# Patient Record
Sex: MALE | Race: WHITE | NOT HISPANIC OR LATINO | Employment: OTHER | URBAN - METROPOLITAN AREA
[De-identification: names, ages, dates, MRNs, and addresses within clinical notes are randomized per-mention and may not be internally consistent; named-entity substitution may affect disease eponyms.]

---

## 2023-04-05 ENCOUNTER — OFFICE VISIT (OUTPATIENT)
Dept: PRIMARY CARE | Facility: CLINIC | Age: 78
End: 2023-04-05
Payer: MEDICARE

## 2023-04-05 VITALS
SYSTOLIC BLOOD PRESSURE: 130 MMHG | HEART RATE: 47 BPM | DIASTOLIC BLOOD PRESSURE: 66 MMHG | BODY MASS INDEX: 26.77 KG/M2 | OXYGEN SATURATION: 96 % | WEIGHT: 197.4 LBS | TEMPERATURE: 97.1 F

## 2023-04-05 DIAGNOSIS — Z00.00 HEALTHCARE MAINTENANCE: ICD-10-CM

## 2023-04-05 DIAGNOSIS — F39 MOOD DISORDER (CMS-HCC): ICD-10-CM

## 2023-04-05 DIAGNOSIS — R55 SYNCOPE, UNSPECIFIED SYNCOPE TYPE: Primary | ICD-10-CM

## 2023-04-05 DIAGNOSIS — R00.1 SINUS BRADYCARDIA: ICD-10-CM

## 2023-04-05 DIAGNOSIS — E78.49 OTHER HYPERLIPIDEMIA: ICD-10-CM

## 2023-04-05 DIAGNOSIS — F10.10 ALCOHOL ABUSE: ICD-10-CM

## 2023-04-05 PROBLEM — R73.01 ELEVATED FASTING GLUCOSE: Status: ACTIVE | Noted: 2023-04-05

## 2023-04-05 PROBLEM — I70.0 ABDOMINAL AORTIC ATHEROSCLEROSIS (CMS-HCC): Status: ACTIVE | Noted: 2023-04-05

## 2023-04-05 PROBLEM — R79.89 LOW VITAMIN B12 LEVEL: Status: ACTIVE | Noted: 2023-04-05

## 2023-04-05 PROBLEM — H93.19 TINNITUS: Status: ACTIVE | Noted: 2023-04-05

## 2023-04-05 PROBLEM — F41.9 ANXIETY: Status: ACTIVE | Noted: 2023-04-05

## 2023-04-05 PROBLEM — F32.A MILD DEPRESSION: Status: ACTIVE | Noted: 2023-04-05

## 2023-04-05 PROBLEM — F03.918 DEMENTIA WITH BEHAVIORAL DISTURBANCE (MULTI): Status: ACTIVE | Noted: 2020-06-29

## 2023-04-05 PROBLEM — N18.31 STAGE 3A CHRONIC KIDNEY DISEASE (MULTI): Status: ACTIVE | Noted: 2023-04-05

## 2023-04-05 PROBLEM — I10 BENIGN ESSENTIAL HTN: Status: ACTIVE | Noted: 2023-04-05

## 2023-04-05 PROBLEM — E78.5 HYPERLIPIDEMIA: Status: ACTIVE | Noted: 2023-04-05

## 2023-04-05 PROBLEM — M1A.9XX0 CHRONIC GOUT: Status: ACTIVE | Noted: 2023-04-05

## 2023-04-05 PROCEDURE — 3078F DIAST BP <80 MM HG: CPT | Performed by: NURSE PRACTITIONER

## 2023-04-05 PROCEDURE — 1036F TOBACCO NON-USER: CPT | Performed by: NURSE PRACTITIONER

## 2023-04-05 PROCEDURE — 93000 ELECTROCARDIOGRAM COMPLETE: CPT | Performed by: NURSE PRACTITIONER

## 2023-04-05 PROCEDURE — 99214 OFFICE O/P EST MOD 30 MIN: CPT | Performed by: NURSE PRACTITIONER

## 2023-04-05 PROCEDURE — 1159F MED LIST DOCD IN RCRD: CPT | Performed by: NURSE PRACTITIONER

## 2023-04-05 PROCEDURE — 3075F SYST BP GE 130 - 139MM HG: CPT | Performed by: NURSE PRACTITIONER

## 2023-04-05 PROCEDURE — 1160F RVW MEDS BY RX/DR IN RCRD: CPT | Performed by: NURSE PRACTITIONER

## 2023-04-05 RX ORDER — LISINOPRIL AND HYDROCHLOROTHIAZIDE 12.5; 2 MG/1; MG/1
1 TABLET ORAL DAILY
COMMUNITY
Start: 2020-08-04 | End: 2023-05-10

## 2023-04-05 RX ORDER — COLCHICINE 0.6 MG/1
0.6 CAPSULE ORAL DAILY
COMMUNITY
Start: 2023-03-15 | End: 2024-02-19 | Stop reason: SDUPTHER

## 2023-04-05 RX ORDER — LAMOTRIGINE 200 MG/1
200 TABLET ORAL DAILY
COMMUNITY
Start: 2022-08-04

## 2023-04-05 ASSESSMENT — ENCOUNTER SYMPTOMS
DIZZINESS: 0
FEVER: 0
DYSURIA: 0
VOMITING: 0
FACIAL ASYMMETRY: 0
SEIZURES: 0
FATIGUE: 0
CHILLS: 0
NUMBNESS: 0
HEADACHES: 0
WEAKNESS: 0
COUGH: 0
FREQUENCY: 0
CONFUSION: 0
NAUSEA: 0
SHORTNESS OF BREATH: 0
DIARRHEA: 0
ABDOMINAL PAIN: 0

## 2023-04-05 NOTE — PATIENT INSTRUCTIONS
Obtain fasting blood work as ordered  Referral placed to cardiology for sinus bradycardia and changes on EKG-you have an appointment scheduled tomorrow with Dr. Espinosa at 2 PM  Schedule stress echocardiogram as ordered  Schedule CT cardiac scoring as ordered  Schedule bilateral vascular carotid ultrasound as ordered  If you develop recurrent symptoms please report to the emergency room immediately  Follow-up with your PCP as scheduled

## 2023-04-05 NOTE — ASSESSMENT & PLAN NOTE
Patient reports averaging 2-3 beers daily   Occasionally pham  Educated patient on alcoholism and effects on body symptoms  Encouraged patient to cut back on alcohol use.    Complex Repair And A-T Advancement Flap Text: The defect edges were debeveled with a #15 scalpel blade.  The primary defect was closed partially with a complex linear closure.  Given the location of the remaining defect, shape of the defect and the proximity to free margins an A-T advancement flap was deemed most appropriate for complete closure of the defect.  Using a sterile surgical marker, an appropriate advancement flap was drawn incorporating the defect and placing the expected incisions within the relaxed skin tension lines where possible.    The area thus outlined was incised deep to adipose tissue with a #15 scalpel blade.  The skin margins were undermined to an appropriate distance in all directions utilizing iris scissors.

## 2023-04-05 NOTE — PROGRESS NOTES
Subjective   Chief Complaint: Loss of Consciousness (2-3 minutes. 4/4).    HPI   Bonifacio Ferraro is a 77 y.o. male who presents for Loss of Consciousness (2-3 minutes. 4/4).  Patient presents today with his wife at side.  They report that they were at the [employer] yesterday evening for dinner and they were waiting for their check.  Patient's wife reports that the patient reached for his Coke and then grabbed his throat and experienced a syncopal episode.  She reports that his eyes remained open but he did not respond.  She reports that this lasted about 2 to 3 minutes and then patient came back to consciousness.  Patient does not recall why he grabbed his throat prior to the event or has any recollection of the event event occurring.  He denies anything similar in the past.  Denies anything unusual about how he was feeling yesterday. He did report being outside working in the yard for multiple hours and believes that he may have overdone it.  Patient does have about 2 beers daily and reports that he did have 2 beers prior to his dinner.    He denies history of heart disease.   He is on Lamicatal for mood disorder and follows with a nurse psychologist.     Patient denies fever, chills, nausea, vomiting, diarrhea, chest pain, heart palpations, or shortness of breath.      Review of Systems   Constitutional:  Negative for chills, fatigue and fever.   Respiratory:  Negative for cough and shortness of breath.    Cardiovascular:  Negative for chest pain.   Gastrointestinal:  Negative for abdominal pain, diarrhea, nausea and vomiting.   Genitourinary:  Negative for dysuria, frequency and urgency.   Neurological:  Positive for syncope. Negative for dizziness, seizures, facial asymmetry, weakness, numbness and headaches.   Psychiatric/Behavioral:  Negative for behavioral problems and confusion.        Objective   /66   Pulse (!) 47   Temp 36.2 °C (97.1 °F) (Temporal)   Wt 89.5 kg (197 lb 6.4 oz)   SpO2  96%   BMI 26.77 kg/m²   BSA Body surface area is 2.13 meters squared.      Physical Exam  Constitutional:       Appearance: Normal appearance.   Cardiovascular:      Rate and Rhythm: Normal rate and regular rhythm.   Pulmonary:      Effort: Pulmonary effort is normal.      Breath sounds: Normal breath sounds.   Abdominal:      General: Abdomen is flat.      Palpations: Abdomen is soft.   Neurological:      General: No focal deficit present.      Mental Status: He is alert.   Psychiatric:         Mood and Affect: Mood normal.         Behavior: Behavior normal.       No visits with results within 1 Year(s) from this visit.   Latest known visit with results is:   [facility] Encounter on 09/14/2021   Component Date Value Ref Range Status    TSH 09/14/2021 2.20  0.44 - 3.98 mIU/L Final    Comment:  TSH testing is performed using different testing    methodology at [facility] than at other    [facility]. Direct result comparisons should    only be made within the same method.      Glucose 09/14/2021 93  74 - 99 mg/dL Final    Sodium 09/14/2021 142  136 - 145 mmol/L Final    Potassium 09/14/2021 4.3  3.5 - 5.3 mmol/L Final    Chloride 09/14/2021 102  98 - 107 mmol/L Final    Bicarbonate 09/14/2021 26  21 - 32 mmol/L Final    Anion Gap 09/14/2021 18  10 - 20 mmol/L Final    Urea Nitrogen 09/14/2021 17  6 - 23 mg/dL Final    Creatinine 09/14/2021 1.23  0.50 - 1.30 mg/dL Final    GLOMERULAR FILTRATION RATE-NON AFR* 09/14/2021 58 (A)  >60 mL/min/1.73m2 Final    GLOMERULAR FILTRATION RATE-* 09/14/2021 70  >60 mL/min/1.73m2 Final    Comment:  CALCULATIONS OF ESTIMATED GFR ARE PERFORMED   USING THE MDRD STUDY EQUATION FOR THE   IDMS-TRACEABLE CREATININE METHODS.   CLIN CHEM 2007;53:766-72      Calcium 09/14/2021 9.3  8.6 - 10.6 mg/dL Final    Albumin 09/14/2021 4.5  3.4 - 5.0 g/dL Final    Alkaline Phosphatase 09/14/2021 57  33 - 136 U/L Final    Total Protein 09/14/2021 7.3  6.4 - 8.2 g/dL Final    AST  09/14/2021 17  9 - 39 U/L Final    Total Bilirubin 09/14/2021 1.6 (H)  0.0 - 1.2 mg/dL Final    ALT (SGPT) 09/14/2021 10  10 - 52 U/L Final    Comment:  Patients treated with Sulfasalazine may generate    falsely decreased results for ALT.      Vitamin B-12 09/14/2021 361  211 - 911 pg/mL Final    Cholesterol 09/14/2021 176  0 - 199 mg/dL Final    Comment: .      AGE      DESIRABLE   BORDERLINE HIGH   HIGH     0-19 Y     0 - 169       170 - 199     >/= 200    20-24 Y     0 - 189       190 - 224     >/= 225         >24 Y     0 - 199       200 - 239     >/= 240   **All ranges are based on fasting samples. Specific   therapeutic targets will vary based on patient-specific   cardiac risk.  .   Pediatric guidelines reference:Pediatrics 2011, 128(S5).   Adult guidelines reference: NCEP ATPIII Guidelines,     DARNELL 2001, 258:2486-97  .   Venipuncture immediately after or during the    administration of Metamizole may lead to falsely   low results. Testing should be performed immediately   prior to Metamizole dosing.      HDL 09/14/2021 43.1  mg/dL Final    Comment: .      AGE      VERY LOW   LOW     NORMAL    HIGH       0-19 Y       < 35   < 40     40-45     ----    20-24 Y       ----   < 40       >45     ----      >24 Y       ----   < 40     40-60      >60  .      Cholesterol/HDL Ratio 09/14/2021 4.1   Final    Comment: REF VALUES  DESIRABLE  < 3.4  HIGH RISK  > 5.0      LDL 09/14/2021 111 (H)  0 - 99 mg/dL Final    Comment: .                           NEAR      BORD      AGE      DESIRABLE  OPTIMAL    HIGH     HIGH     VERY HIGH     0-19 Y     0 - 109     ---    110-129   >/= 130     ----    20-24 Y     0 - 119     ---    120-159   >/= 160     ----      >24 Y     0 -  99   100-129  130-159   160-189     >/=190  .      VLDL 09/14/2021 22  0 - 40 mg/dL Final    Triglycerides 09/14/2021 109  0 - 149 mg/dL Final    Comment: .      AGE      DESIRABLE   BORDERLINE HIGH   HIGH     VERY HIGH   0 D-90 D    19 - 174         ----          ----        ----  91 D- 9 Y     0 -  74        75 -  99     >/= 100      ----    10-19 Y     0 -  89        90 - 129     >/= 130      ----    20-24 Y     0 - 114       115 - 149     >/= 150      ----         >24 Y     0 - 149       150 - 199    200- 499    >/= 500  .   Venipuncture immediately after or during the    administration of Metamizole may lead to falsely   low results. Testing should be performed immediately   prior to Metamizole dosing.      Uric Acid 09/14/2021 7.6 (H)  4.0 - 7.5 mg/dL Final    Comment:  Venipuncture immediately after or during the    administration of Metamizole may lead to falsely   low results. Testing should be performed immediately   prior to Metamizole dosing.      Color, Urine 09/14/2021 YELLOW  STRAW,YELLOW Final    Appearance, Urine 09/14/2021 CLEAR  CLEAR Final    Specific Gravity, Urine 09/14/2021 1.018  1.005 - 1.035 Final    pH, Urine 09/14/2021 5.0  5.0 - 8.0 Final    Protein, Urine 09/14/2021 NEGATIVE  NEGATIVE mg/dL Final    Glucose, Urine 09/14/2021 NEGATIVE  NEGATIVE mg/dL Final    Blood, Urine 09/14/2021 NEGATIVE  NEGATIVE Final    Ketones, Urine 09/14/2021 NEGATIVE  NEGATIVE mg/dL Final    Bilirubin, Urine 09/14/2021 NEGATIVE  NEGATIVE Final    Urobilinogen, Urine 09/14/2021 <2.0  0.0 - 1.9 mg/dL Final    Nitrite, Urine 09/14/2021 NEGATIVE  NEGATIVE Final    Leukocyte Esterase, Urine 09/14/2021 NEGATIVE  NEGATIVE Final    WBC 09/14/2021 6.2  4.4 - 11.3 x10E9/L Final    nRBC 09/14/2021 0.0  0.0 - 0.0 /100 WBC Final    RBC 09/14/2021 4.77  4.50 - 5.90 x10E12/L Final    Hemoglobin 09/14/2021 15.0  13.5 - 17.5 g/dL Final    Hematocrit 09/14/2021 46.1  41.0 - 52.0 % Final    MCV 09/14/2021 97  80 - 100 fL Final    MCHC 09/14/2021 32.5  32.0 - 36.0 g/dL Final    Platelets 09/14/2021 235  150 - 450 x10E9/L Final    RDW 09/14/2021 12.7  11.5 - 14.5 % Final    Neutrophils % 09/14/2021 71.4  40.0 - 80.0 % Final    Immature Granulocytes %, Automated 09/14/2021 0.5  0.0 -  0.9 % Final    Comment:  Immature Granulocyte Count (IG) includes promyelocytes,    myelocytes and metamyelocytes but does not include bands.   Percent differential counts (%) should be interpreted in the   context of the absolute cell counts (cells/L).      Lymphocytes % 09/14/2021 14.6  13.0 - 44.0 % Final    Monocytes % 09/14/2021 10.4  2.0 - 10.0 % Final    Eosinophils % 09/14/2021 2.6  0.0 - 6.0 % Final    Basophils % 09/14/2021 0.5  0.0 - 2.0 % Final    Neutrophils Absolute 09/14/2021 4.46  1.60 - 5.50 x10E9/L Final    Lymphocytes Absolute 09/14/2021 0.91  0.80 - 3.00 x10E9/L Final    Monocytes Absolute 09/14/2021 0.65  0.05 - 0.80 x10E9/L Final    Eosinophils Absolute 09/14/2021 0.16  0.00 - 0.40 x10E9/L Final    Basophils Absolute 09/14/2021 0.03  0.00 - 0.10 x10E9/L Final    Prostate Specific Antigen,Screen 09/14/2021 0.88  0.00 - 4.00 ng/mL Final    Comment: The FDA requires that the method used for PSA assay be   reported to the physician. Values obtained with different   assay methods must not be used interchangeably. This test   was performed at [facility] using the Siemens  ABC Livellica PSA method, which is a sandwich immunoassay using   chemiluminescence for quantitation. The assay is approved  for measurement of prostate-specific antigen (PSA) in   serum and may be used in conjunction with a digital rectal  examination in men 50 years and older as an aid in   detection of prostate cancer.   5-Alpha-reductase inhibitors (e.g. Proscar, Finasteride,   Avodart, Dutasteride and Ariadna) for the treatment of BPH   have been shown to lower PSA levels by an average of 50%   after 6 months of treatment.      LAMOTRIGINE LEVEL 09/14/2021 5.2  2.5 - 15.0 ug/mL Final     Current Outpatient Medications on File Prior to Visit   Medication Sig Dispense Refill    colchicine (Mitigare) 0.6 mg capsule capsule Take 1 capsule (0.6 mg) by mouth once daily.      lamoTRIgine (LaMICtal) 200 mg tablet Take 1  tablet (200 mg) by mouth once daily.      lisinopriL-hydrochlorothiazide 20-12.5 mg tablet Take 1 tablet by mouth once daily.       No current facility-administered medications on file prior to visit.     No images are attached to the encounter.            Assessment/Plan   Problem List Items Addressed This Visit          Circulatory    Sinus bradycardia     IO EKG shows Sinus eros (HR 47)  Referral to cardiology             Relevant Orders    Holter or Event Cardiac Monitor    ECG 12 lead (Completed)       Other    Syncope - Primary     Patient experienced syncopal episode yesterday while at dinner waiting for his check.   - IO EKG shows Sinus bradycardia (HR 47) and unclear if new Q waves -- patient declined ER   - BW ordered   - 7 day holter monitor ordered  - Patient referred to cardiology and apt scheduled for tomorrow at 2pm   - B/l carotid vascular US ordered   - Cardiac stress test ordered   - CT cardiac score ordered   - Advised patient that if symptoms recur he should report to the ER immediately -- patient is agreeable          Relevant Orders    CT cardiac scoring wo IV contrast    Vascular US carotid artery duplex bilateral    CBC and Auto Differential    Comprehensive Metabolic Panel    Echocardiogram stress test    Holter or Event Cardiac Monitor    ECG 12 lead (Completed)    Alcohol abuse     Patient reports averaging 2-3 beers daily   Occasionally whiskey  Educated patient on alcoholism and effects on body symptoms  Encouraged patient to cut back on alcohol use.          Hyperlipidemia     BW ordered  CT cardiac score ordered          Mood disorder (CMS/HCC)     Follows with nurse psych  Continues on Lamictal           Other Visit Diagnoses       Healthcare maintenance        Relevant Orders    CBC and Auto Differential    Comprehensive Metabolic Panel    TSH with reflex to Free T4 if abnormal    Lipid Panel    PSA, Total and Free

## 2023-04-05 NOTE — ASSESSMENT & PLAN NOTE
Patient experienced syncopal episode yesterday while at dinner waiting for his check.   - IO EKG shows Sinus bradycardia (HR 47) and unclear if new Q waves -- patient declined ER   - BW ordered   - 7 day holter monitor ordered  - Patient referred to cardiology and apt scheduled for tomorrow at 2pm   - B/l carotid vascular US ordered   - Cardiac stress test ordered   - CT cardiac score ordered   - Advised patient that if symptoms recur he should report to the ER immediately -- patient is agreeable

## 2023-04-06 ENCOUNTER — LAB (OUTPATIENT)
Dept: LAB | Facility: LAB | Age: 78
End: 2023-04-06
Payer: MEDICARE

## 2023-04-06 DIAGNOSIS — Z00.00 HEALTHCARE MAINTENANCE: ICD-10-CM

## 2023-04-06 DIAGNOSIS — R55 SYNCOPE, UNSPECIFIED SYNCOPE TYPE: ICD-10-CM

## 2023-04-06 LAB
ALANINE AMINOTRANSFERASE (SGPT) (U/L) IN SER/PLAS: 11 U/L (ref 10–52)
ALBUMIN (G/DL) IN SER/PLAS: 4.3 G/DL (ref 3.4–5)
ALKALINE PHOSPHATASE (U/L) IN SER/PLAS: 55 U/L (ref 33–136)
ANION GAP IN SER/PLAS: 15 MMOL/L (ref 10–20)
ASPARTATE AMINOTRANSFERASE (SGOT) (U/L) IN SER/PLAS: 29 U/L (ref 9–39)
BASOPHILS (10*3/UL) IN BLOOD BY AUTOMATED COUNT: 0.03 X10E9/L (ref 0–0.1)
BASOPHILS/100 LEUKOCYTES IN BLOOD BY AUTOMATED COUNT: 0.6 % (ref 0–2)
BILIRUBIN TOTAL (MG/DL) IN SER/PLAS: 1.7 MG/DL (ref 0–1.2)
CALCIUM (MG/DL) IN SER/PLAS: 9.1 MG/DL (ref 8.6–10.6)
CARBON DIOXIDE, TOTAL (MMOL/L) IN SER/PLAS: 26 MMOL/L (ref 21–32)
CHLORIDE (MMOL/L) IN SER/PLAS: 101 MMOL/L (ref 98–107)
CHOLESTEROL (MG/DL) IN SER/PLAS: 184 MG/DL (ref 0–199)
CHOLESTEROL IN HDL (MG/DL) IN SER/PLAS: 50.6 MG/DL
CHOLESTEROL/HDL RATIO: 3.6
CREATININE (MG/DL) IN SER/PLAS: 1.13 MG/DL (ref 0.5–1.3)
EOSINOPHILS (10*3/UL) IN BLOOD BY AUTOMATED COUNT: 0.29 X10E9/L (ref 0–0.4)
EOSINOPHILS/100 LEUKOCYTES IN BLOOD BY AUTOMATED COUNT: 5.6 % (ref 0–6)
ERYTHROCYTE DISTRIBUTION WIDTH (RATIO) BY AUTOMATED COUNT: 12.7 % (ref 11.5–14.5)
ERYTHROCYTE MEAN CORPUSCULAR HEMOGLOBIN CONCENTRATION (G/DL) BY AUTOMATED: 32.4 G/DL (ref 32–36)
ERYTHROCYTE MEAN CORPUSCULAR VOLUME (FL) BY AUTOMATED COUNT: 95 FL (ref 80–100)
ERYTHROCYTES (10*6/UL) IN BLOOD BY AUTOMATED COUNT: 5.1 X10E12/L (ref 4.5–5.9)
GFR MALE: 68 ML/MIN/1.73M2
GLUCOSE (MG/DL) IN SER/PLAS: 96 MG/DL (ref 74–99)
HEMATOCRIT (%) IN BLOOD BY AUTOMATED COUNT: 48.4 % (ref 41–52)
HEMOGLOBIN (G/DL) IN BLOOD: 15.7 G/DL (ref 13.5–17.5)
IMMATURE GRANULOCYTES/100 LEUKOCYTES IN BLOOD BY AUTOMATED COUNT: 0.4 % (ref 0–0.9)
LDL: 108 MG/DL (ref 0–99)
LEUKOCYTES (10*3/UL) IN BLOOD BY AUTOMATED COUNT: 5.2 X10E9/L (ref 4.4–11.3)
LYMPHOCYTES (10*3/UL) IN BLOOD BY AUTOMATED COUNT: 1.03 X10E9/L (ref 0.8–3)
LYMPHOCYTES/100 LEUKOCYTES IN BLOOD BY AUTOMATED COUNT: 19.8 % (ref 13–44)
MONOCYTES (10*3/UL) IN BLOOD BY AUTOMATED COUNT: 0.61 X10E9/L (ref 0.05–0.8)
MONOCYTES/100 LEUKOCYTES IN BLOOD BY AUTOMATED COUNT: 11.7 % (ref 2–10)
NEUTROPHILS (10*3/UL) IN BLOOD BY AUTOMATED COUNT: 3.22 X10E9/L (ref 1.6–5.5)
NEUTROPHILS/100 LEUKOCYTES IN BLOOD BY AUTOMATED COUNT: 61.9 % (ref 40–80)
NRBC (PER 100 WBCS) BY AUTOMATED COUNT: 0 /100 WBC (ref 0–0)
PLATELETS (10*3/UL) IN BLOOD AUTOMATED COUNT: 234 X10E9/L (ref 150–450)
POTASSIUM (MMOL/L) IN SER/PLAS: 5.2 MMOL/L (ref 3.5–5.3)
PROTEIN TOTAL: 7.1 G/DL (ref 6.4–8.2)
SODIUM (MMOL/L) IN SER/PLAS: 137 MMOL/L (ref 136–145)
THYROTROPIN (MIU/L) IN SER/PLAS BY DETECTION LIMIT <= 0.05 MIU/L: 1.35 MIU/L (ref 0.44–3.98)
TRIGLYCERIDE (MG/DL) IN SER/PLAS: 129 MG/DL (ref 0–149)
UREA NITROGEN (MG/DL) IN SER/PLAS: 25 MG/DL (ref 6–23)
VLDL: 26 MG/DL (ref 0–40)

## 2023-04-06 PROCEDURE — 84154 ASSAY OF PSA FREE: CPT

## 2023-04-06 PROCEDURE — 80061 LIPID PANEL: CPT

## 2023-04-06 PROCEDURE — 84443 ASSAY THYROID STIM HORMONE: CPT

## 2023-04-06 PROCEDURE — 36415 COLL VENOUS BLD VENIPUNCTURE: CPT

## 2023-04-06 PROCEDURE — 85025 COMPLETE CBC W/AUTO DIFF WBC: CPT

## 2023-04-06 PROCEDURE — 80053 COMPREHEN METABOLIC PANEL: CPT

## 2023-04-06 PROCEDURE — 84153 ASSAY OF PSA TOTAL: CPT

## 2023-04-10 LAB
PROSTATE SPECIFIC AG (NG/ML) IN SER/PLAS: 0.8 NG/ML (ref 0–4)
PROSTATE SPECIFIC AG FREE (NG/ML) IN SER/PLAS: 0.2 NG/ML
PROSTATE SPECIFIC AG FREE/PROSTATE SPECIFIC AG TOTAL IN SER/PLAS: 25 %

## 2023-04-27 ENCOUNTER — OFFICE VISIT (OUTPATIENT)
Dept: PRIMARY CARE | Facility: CLINIC | Age: 78
End: 2023-04-27
Payer: MEDICARE

## 2023-04-27 ENCOUNTER — APPOINTMENT (OUTPATIENT)
Dept: PRIMARY CARE | Facility: CLINIC | Age: 78
End: 2023-04-27
Payer: MEDICARE

## 2023-04-27 VITALS
BODY MASS INDEX: 27.58 KG/M2 | HEIGHT: 71 IN | HEART RATE: 52 BPM | TEMPERATURE: 96.6 F | WEIGHT: 197 LBS | OXYGEN SATURATION: 96 % | DIASTOLIC BLOOD PRESSURE: 56 MMHG | SYSTOLIC BLOOD PRESSURE: 122 MMHG

## 2023-04-27 DIAGNOSIS — F39 MOOD DISORDER (CMS-HCC): ICD-10-CM

## 2023-04-27 DIAGNOSIS — I70.0 ABDOMINAL AORTIC ATHEROSCLEROSIS (CMS-HCC): ICD-10-CM

## 2023-04-27 DIAGNOSIS — N18.31 STAGE 3A CHRONIC KIDNEY DISEASE (MULTI): ICD-10-CM

## 2023-04-27 DIAGNOSIS — Z00.00 MEDICARE ANNUAL WELLNESS VISIT, SUBSEQUENT: Primary | ICD-10-CM

## 2023-04-27 DIAGNOSIS — F03.918 DEMENTIA WITH BEHAVIORAL DISTURBANCE (MULTI): ICD-10-CM

## 2023-04-27 DIAGNOSIS — Z71.89 CARDIAC RISK COUNSELING: ICD-10-CM

## 2023-04-27 DIAGNOSIS — Z00.00 ROUTINE GENERAL MEDICAL EXAMINATION AT HEALTH CARE FACILITY: ICD-10-CM

## 2023-04-27 DIAGNOSIS — Z13.89 ENCOUNTER FOR SCREENING FOR OTHER DISORDER: ICD-10-CM

## 2023-04-27 DIAGNOSIS — E78.49 OTHER HYPERLIPIDEMIA: ICD-10-CM

## 2023-04-27 DIAGNOSIS — R79.89 LOW VITAMIN B12 LEVEL: ICD-10-CM

## 2023-04-27 DIAGNOSIS — F41.9 ANXIETY: ICD-10-CM

## 2023-04-27 DIAGNOSIS — M1A.9XX0 CHRONIC GOUT INVOLVING TOE OF RIGHT FOOT WITHOUT TOPHUS, UNSPECIFIED CAUSE: ICD-10-CM

## 2023-04-27 DIAGNOSIS — I10 BENIGN ESSENTIAL HTN: ICD-10-CM

## 2023-04-27 DIAGNOSIS — R00.1 SINUS BRADYCARDIA: ICD-10-CM

## 2023-04-27 DIAGNOSIS — F32.A MILD DEPRESSION: ICD-10-CM

## 2023-04-27 DIAGNOSIS — E55.9 VITAMIN D DEFICIENCY: ICD-10-CM

## 2023-04-27 PROCEDURE — G0439 PPPS, SUBSEQ VISIT: HCPCS | Performed by: STUDENT IN AN ORGANIZED HEALTH CARE EDUCATION/TRAINING PROGRAM

## 2023-04-27 PROCEDURE — G0446 INTENS BEHAVE THER CARDIO DX: HCPCS | Performed by: STUDENT IN AN ORGANIZED HEALTH CARE EDUCATION/TRAINING PROGRAM

## 2023-04-27 PROCEDURE — 3078F DIAST BP <80 MM HG: CPT | Performed by: STUDENT IN AN ORGANIZED HEALTH CARE EDUCATION/TRAINING PROGRAM

## 2023-04-27 PROCEDURE — 1159F MED LIST DOCD IN RCRD: CPT | Performed by: STUDENT IN AN ORGANIZED HEALTH CARE EDUCATION/TRAINING PROGRAM

## 2023-04-27 PROCEDURE — 1036F TOBACCO NON-USER: CPT | Performed by: STUDENT IN AN ORGANIZED HEALTH CARE EDUCATION/TRAINING PROGRAM

## 2023-04-27 PROCEDURE — 1160F RVW MEDS BY RX/DR IN RCRD: CPT | Performed by: STUDENT IN AN ORGANIZED HEALTH CARE EDUCATION/TRAINING PROGRAM

## 2023-04-27 PROCEDURE — 3074F SYST BP LT 130 MM HG: CPT | Performed by: STUDENT IN AN ORGANIZED HEALTH CARE EDUCATION/TRAINING PROGRAM

## 2023-04-27 PROCEDURE — G0444 DEPRESSION SCREEN ANNUAL: HCPCS | Performed by: STUDENT IN AN ORGANIZED HEALTH CARE EDUCATION/TRAINING PROGRAM

## 2023-04-27 PROCEDURE — 99397 PER PM REEVAL EST PAT 65+ YR: CPT | Performed by: STUDENT IN AN ORGANIZED HEALTH CARE EDUCATION/TRAINING PROGRAM

## 2023-04-27 PROCEDURE — 1170F FXNL STATUS ASSESSED: CPT | Performed by: STUDENT IN AN ORGANIZED HEALTH CARE EDUCATION/TRAINING PROGRAM

## 2023-04-27 PROCEDURE — 99214 OFFICE O/P EST MOD 30 MIN: CPT | Performed by: STUDENT IN AN ORGANIZED HEALTH CARE EDUCATION/TRAINING PROGRAM

## 2023-04-27 ASSESSMENT — PATIENT HEALTH QUESTIONNAIRE - PHQ9
2. FEELING DOWN, DEPRESSED OR HOPELESS: NOT AT ALL
SUM OF ALL RESPONSES TO PHQ9 QUESTIONS 1 AND 2: 0
1. LITTLE INTEREST OR PLEASURE IN DOING THINGS: NOT AT ALL

## 2023-04-27 ASSESSMENT — ENCOUNTER SYMPTOMS
RHINORRHEA: 0
FEVER: 0
LIGHT-HEADEDNESS: 0
NERVOUS/ANXIOUS: 0
ARTHRALGIAS: 0
ABDOMINAL PAIN: 0
DEPRESSION: 0
COUGH: 0
DIARRHEA: 0
OCCASIONAL FEELINGS OF UNSTEADINESS: 0
CHILLS: 0
LOSS OF SENSATION IN FEET: 0
COLOR CHANGE: 0
SORE THROAT: 0
NUMBNESS: 0
MYALGIAS: 0
FATIGUE: 0
DYSPHORIC MOOD: 0
DYSURIA: 0
CONSTIPATION: 0
DIZZINESS: 0
VOMITING: 0
SHORTNESS OF BREATH: 0
PALPITATIONS: 0
FREQUENCY: 0
NAUSEA: 0

## 2023-04-27 ASSESSMENT — ACTIVITIES OF DAILY LIVING (ADL)
DOING_HOUSEWORK: INDEPENDENT
DRESSING: INDEPENDENT
GROCERY_SHOPPING: INDEPENDENT
TAKING_MEDICATION: NEEDS ASSISTANCE
BATHING: INDEPENDENT
MANAGING_FINANCES: INDEPENDENT

## 2023-04-27 NOTE — PROGRESS NOTES
"Subjective   Reason for Visit: Bonifacio Ferraro is an 77 y.o. male here for a Medicare Wellness visit.     Past Medical, Surgical, and Family History reviewed and updated in chart.         HPI    In between coming in from his appointment, he had passed out and was seen in the office and then by cardiology.  He denies any current symptoms.    He is taking uric acid medication and would sarah follow up on that.    Dental: Once yearly  Vision: Have worn some cheaters for reading. No other glasses.    Last Colonoscopy: 4-5 years ago. Thinks he is coming up due.  AAA Screenin  Low Dose Lung CT Screening: Not indicated    Influenza: Up to date  Tetanus: May 2013  Pneumonia: Complete  COVID: Up to date  Shingles: Complete     Patient Care Team:  Bonifacio Lao DO as PCP - General     Review of Systems   Constitutional:  Negative for chills, fatigue and fever.   HENT:  Negative for congestion, rhinorrhea and sore throat.    Eyes:  Negative for visual disturbance.   Respiratory:  Negative for cough and shortness of breath.    Cardiovascular:  Negative for chest pain and palpitations.   Gastrointestinal:  Negative for abdominal pain, constipation, diarrhea, nausea and vomiting.   Genitourinary:  Negative for dysuria and frequency.   Musculoskeletal:  Negative for arthralgias and myalgias.   Skin:  Negative for color change and rash.   Neurological:  Negative for dizziness, light-headedness and numbness.   Psychiatric/Behavioral:  Negative for dysphoric mood. The patient is not nervous/anxious.        Objective   Vitals:  /56   Pulse 52   Temp 35.9 °C (96.6 °F)   Ht 1.791 m (5' 10.5\")   Wt 89.4 kg (197 lb)   SpO2 96%   BMI 27.87 kg/m²       Physical Exam  Vitals and nursing note reviewed.   Constitutional:       General: He is not in acute distress.     Appearance: Normal appearance. He is normal weight. He is not ill-appearing or toxic-appearing.   HENT:      Head: Normocephalic and atraumatic.      Right " Ear: Tympanic membrane, ear canal and external ear normal.      Left Ear: Tympanic membrane, ear canal and external ear normal.      Nose: Nose normal.      Mouth/Throat:      Mouth: Mucous membranes are moist.      Pharynx: Oropharynx is clear.   Eyes:      Extraocular Movements: Extraocular movements intact.      Conjunctiva/sclera: Conjunctivae normal.      Pupils: Pupils are equal, round, and reactive to light.   Cardiovascular:      Rate and Rhythm: Normal rate and regular rhythm.      Pulses: Normal pulses.      Heart sounds: Normal heart sounds.   Pulmonary:      Effort: Pulmonary effort is normal.      Breath sounds: Normal breath sounds.   Abdominal:      General: Abdomen is flat. Bowel sounds are normal.      Palpations: Abdomen is soft.   Musculoskeletal:         General: Normal range of motion.      Cervical back: Normal range of motion and neck supple.   Skin:     General: Skin is warm and dry.   Neurological:      General: No focal deficit present.      Mental Status: He is alert and oriented to person, place, and time. Mental status is at baseline.      Cranial Nerves: No cranial nerve deficit.      Sensory: No sensory deficit.      Motor: No weakness.   Psychiatric:         Mood and Affect: Mood normal.         Behavior: Behavior normal.         Thought Content: Thought content normal.         Judgment: Judgment normal.       Cardiovascular risk discussed and, if needed, lifestyle modifications recommended, including nutritional choices, exercise, and elimination of habits contributing to risk.  We agreed on a plan to reduce the current cardiovascular risk.  See the ASCVD risk  plus for data discussed regarding risk and risk reduction opportunities.  Aspirin use/disuse was discussed after reviewing updated guidelines.      Assessment/Plan   Problem List Items Addressed This Visit          Nervous    Dementia with behavioral disturbance (CMS/HCC)    Overview     Chronic.  Stable.          Current Assessment & Plan     Referral order placed to neuropsychology for evaluation and continued management.         Relevant Orders    Referral to Neuropsychology       Circulatory    Sinus bradycardia    Overview     Patient following with cardiology.         Current Assessment & Plan     Continuing current medical management.  No current symptoms.  Heart rate stable.         Abdominal aortic atherosclerosis (CMS/HCC)    Overview     No aneurysm.  Last ultrasound on 4/24/2019 demonstrating atherosclerotic disease.         Current Assessment & Plan     No current symptoms.  We will continue to manage.         Benign essential HTN    Overview     Chronic.  Stable.         Current Assessment & Plan     Blood pressure well controlled today.  We will continue with current medical management.         Relevant Orders    Urinalysis with Reflex Microscopic (Completed)       Genitourinary    Stage 3a chronic kidney disease    Overview     Chronic.  Stable.         Current Assessment & Plan     Blood work demonstrates kidney function to be stable.            Endocrine/Metabolic    Vitamin D deficiency    Current Assessment & Plan     Vitamin D level ordered.         Relevant Orders    Vitamin D, Total (Completed)       Hematologic    Low vitamin B12 level    Current Assessment & Plan     History of low B12.  Lab work ordered.         Relevant Orders    Vitamin B12 (Completed)       Other    Anxiety    Overview     Chronic.  Stable.         Current Assessment & Plan     No current symptoms.  Referral to neuropsychology.  Continuing lamotrigine         Chronic gout    Overview     Chronic.  No current symptoms.         Current Assessment & Plan     Currently on colchicine.  Uric acid level ordered.  If no symptoms, can start on allopurinol.         Relevant Orders    Uric acid (Completed)    Hyperlipidemia    Current Assessment & Plan     Awaiting results of CT cardiac scoring.  ASCVD risk score is high.  Reviewed results  of lipid panel.  Statin therapy recommended for risk reduction..  They will wait until after cardiac score comes back.         Mild depression    Overview     Chronic         Current Assessment & Plan     Stable.  Continuing current management.  Referral to neuropsychology.         Mood disorder (CMS/[facility])    Overview     Chronic.  Stable.         Current Assessment & Plan     Follows with psychology. On lamotrigine.  New referral order placed.         Medicare annual wellness visit, subsequent - Primary    Current Assessment & Plan     Overall patient is doing well without any acute concerns or complaints.  Additional lab orders placed.  Referral placed to neuropsychology.  Patient will contact GI to find when he is due for his next screening test.  Patient to continue with regular follow-up.  He will follow-up sooner for acute concerns or complaints.         Relevant Orders    Urinalysis with Reflex Microscopic (Completed)    Referral to Neuropsychology     Other Visit Diagnoses       Encounter for screening for other disorder        Cardiac risk counseling        Routine general medical examination at health care facility

## 2023-04-28 ENCOUNTER — LAB (OUTPATIENT)
Dept: LAB | Facility: LAB | Age: 78
End: 2023-04-28
Payer: MEDICARE

## 2023-04-28 DIAGNOSIS — I10 BENIGN ESSENTIAL HTN: ICD-10-CM

## 2023-04-28 DIAGNOSIS — M1A.9XX0 CHRONIC GOUT INVOLVING TOE OF RIGHT FOOT WITHOUT TOPHUS, UNSPECIFIED CAUSE: ICD-10-CM

## 2023-04-28 DIAGNOSIS — R79.89 LOW VITAMIN B12 LEVEL: ICD-10-CM

## 2023-04-28 DIAGNOSIS — Z00.00 MEDICARE ANNUAL WELLNESS VISIT, SUBSEQUENT: ICD-10-CM

## 2023-04-28 DIAGNOSIS — E55.9 VITAMIN D DEFICIENCY: ICD-10-CM

## 2023-04-28 PROCEDURE — 81003 URINALYSIS AUTO W/O SCOPE: CPT

## 2023-04-28 PROCEDURE — 84550 ASSAY OF BLOOD/URIC ACID: CPT

## 2023-04-28 PROCEDURE — 36415 COLL VENOUS BLD VENIPUNCTURE: CPT

## 2023-04-28 PROCEDURE — 82306 VITAMIN D 25 HYDROXY: CPT

## 2023-04-28 PROCEDURE — 82607 VITAMIN B-12: CPT

## 2023-04-29 LAB
APPEARANCE, URINE: CLEAR
BILIRUBIN, URINE: NEGATIVE
BLOOD, URINE: NEGATIVE
CALCIDIOL (25 OH VITAMIN D3) (NG/ML) IN SER/PLAS: 24 NG/ML
COBALAMIN (VITAMIN B12) (PG/ML) IN SER/PLAS: 301 PG/ML (ref 211–911)
COLOR, URINE: YELLOW
GLUCOSE, URINE: NEGATIVE MG/DL
KETONES, URINE: NEGATIVE MG/DL
LEUKOCYTE ESTERASE, URINE: NEGATIVE
NITRITE, URINE: NEGATIVE
PH, URINE: 5 (ref 5–8)
PROTEIN, URINE: NEGATIVE MG/DL
SPECIFIC GRAVITY, URINE: 1.02 (ref 1–1.03)
URATE (MG/DL) IN SER/PLAS: 8.9 MG/DL (ref 4–7.5)
UROBILINOGEN, URINE: <2 MG/DL (ref 0–1.9)

## 2023-05-10 PROBLEM — Z00.00 MEDICARE ANNUAL WELLNESS VISIT, SUBSEQUENT: Status: ACTIVE | Noted: 2023-05-10

## 2023-05-10 PROBLEM — E55.9 VITAMIN D DEFICIENCY: Status: ACTIVE | Noted: 2023-05-10

## 2023-05-10 RX ORDER — ERGOCALCIFEROL 1.25 MG/1
50000 CAPSULE ORAL
Qty: 8 CAPSULE | Refills: 0 | Status: SHIPPED | OUTPATIENT
Start: 2023-05-10 | End: 2023-07-05

## 2023-05-10 NOTE — ASSESSMENT & PLAN NOTE
Awaiting results of CT cardiac scoring.  ASCVD risk score is high.  Reviewed results of lipid panel.  Statin therapy recommended for risk reduction..  They will wait until after cardiac score comes back.

## 2023-05-10 NOTE — ASSESSMENT & PLAN NOTE
Overall patient is doing well without any acute concerns or complaints.  Additional lab orders placed.  Referral placed to neuropsychology.  Patient will contact GI to find when he is due for his next screening test.  Patient to continue with regular follow-up.  He will follow-up sooner for acute concerns or complaints.

## 2023-05-11 RX ORDER — ALLOPURINOL 100 MG/1
100 TABLET ORAL DAILY
Qty: 90 TABLET | Refills: 0 | Status: SHIPPED | OUTPATIENT
Start: 2023-05-11 | End: 2023-08-09

## 2023-09-19 PROBLEM — G89.29 ACUTE EXACERBATION OF CHRONIC LOW BACK PAIN: Status: ACTIVE | Noted: 2023-09-19

## 2023-09-19 PROBLEM — E79.0 ELEVATED URIC ACID IN BLOOD: Status: ACTIVE | Noted: 2023-09-19

## 2023-09-19 PROBLEM — Z87.891 FORMER SMOKER: Status: ACTIVE | Noted: 2023-09-19

## 2023-09-19 PROBLEM — R90.89 MRI OF BRAIN ABNORMAL: Status: ACTIVE | Noted: 2023-09-19

## 2023-09-19 PROBLEM — M54.50 ACUTE EXACERBATION OF CHRONIC LOW BACK PAIN: Status: ACTIVE | Noted: 2023-09-19

## 2023-09-26 ENCOUNTER — TELEPHONE (OUTPATIENT)
Dept: PRIMARY CARE | Facility: CLINIC | Age: 78
End: 2023-09-26
Payer: MEDICARE

## 2023-09-26 DIAGNOSIS — I10 BENIGN ESSENTIAL HTN: ICD-10-CM

## 2023-09-26 NOTE — TELEPHONE ENCOUNTER
Pt needs a refill on   lisinopriL-hydrochlorothiazide 20-12.5 mg tablet   90 day supply   [facility]

## 2023-09-28 RX ORDER — LISINOPRIL AND HYDROCHLOROTHIAZIDE 12.5; 2 MG/1; MG/1
1 TABLET ORAL DAILY
Qty: 90 TABLET | Refills: 1 | Status: SHIPPED | OUTPATIENT
Start: 2023-09-28 | End: 2024-03-26

## 2023-10-10 ENCOUNTER — OFFICE VISIT (OUTPATIENT)
Dept: PRIMARY CARE | Facility: CLINIC | Age: 78
End: 2023-10-10
Payer: MEDICARE

## 2023-10-10 VITALS
SYSTOLIC BLOOD PRESSURE: 123 MMHG | HEIGHT: 72 IN | OXYGEN SATURATION: 92 % | DIASTOLIC BLOOD PRESSURE: 62 MMHG | BODY MASS INDEX: 26.28 KG/M2 | TEMPERATURE: 97.5 F | HEART RATE: 40 BPM | WEIGHT: 194 LBS

## 2023-10-10 DIAGNOSIS — F41.9 ANXIETY: ICD-10-CM

## 2023-10-10 DIAGNOSIS — F39 MOOD DISORDER (CMS-HCC): ICD-10-CM

## 2023-10-10 DIAGNOSIS — E79.0 ELEVATED URIC ACID IN BLOOD: ICD-10-CM

## 2023-10-10 DIAGNOSIS — M1A.9XX0 CHRONIC GOUT WITHOUT TOPHUS, UNSPECIFIED CAUSE, UNSPECIFIED SITE: ICD-10-CM

## 2023-10-10 DIAGNOSIS — F32.A MILD DEPRESSION: ICD-10-CM

## 2023-10-10 DIAGNOSIS — R00.1 SINUS BRADYCARDIA: ICD-10-CM

## 2023-10-10 DIAGNOSIS — Z12.5 PROSTATE CANCER SCREENING: ICD-10-CM

## 2023-10-10 DIAGNOSIS — F03.918 DEMENTIA WITH BEHAVIORAL DISTURBANCE (MULTI): Primary | ICD-10-CM

## 2023-10-10 DIAGNOSIS — E55.9 VITAMIN D DEFICIENCY: ICD-10-CM

## 2023-10-10 DIAGNOSIS — Z00.00 HEALTHCARE MAINTENANCE: ICD-10-CM

## 2023-10-10 PROCEDURE — 3078F DIAST BP <80 MM HG: CPT | Performed by: STUDENT IN AN ORGANIZED HEALTH CARE EDUCATION/TRAINING PROGRAM

## 2023-10-10 PROCEDURE — 3074F SYST BP LT 130 MM HG: CPT | Performed by: STUDENT IN AN ORGANIZED HEALTH CARE EDUCATION/TRAINING PROGRAM

## 2023-10-10 PROCEDURE — 1036F TOBACCO NON-USER: CPT | Performed by: STUDENT IN AN ORGANIZED HEALTH CARE EDUCATION/TRAINING PROGRAM

## 2023-10-10 PROCEDURE — 1160F RVW MEDS BY RX/DR IN RCRD: CPT | Performed by: STUDENT IN AN ORGANIZED HEALTH CARE EDUCATION/TRAINING PROGRAM

## 2023-10-10 PROCEDURE — 1159F MED LIST DOCD IN RCRD: CPT | Performed by: STUDENT IN AN ORGANIZED HEALTH CARE EDUCATION/TRAINING PROGRAM

## 2023-10-10 PROCEDURE — 99214 OFFICE O/P EST MOD 30 MIN: CPT | Performed by: STUDENT IN AN ORGANIZED HEALTH CARE EDUCATION/TRAINING PROGRAM

## 2023-10-10 ASSESSMENT — ENCOUNTER SYMPTOMS
CONSTIPATION: 0
VOMITING: 0
SHORTNESS OF BREATH: 0
PALPITATIONS: 0
HEADACHES: 0
FATIGUE: 0
ABDOMINAL PAIN: 0
FREQUENCY: 0
NAUSEA: 0
LIGHT-HEADEDNESS: 0
DIARRHEA: 0
FEVER: 0
DIZZINESS: 0
CHILLS: 0
RHINORRHEA: 0

## 2023-10-10 ASSESSMENT — PATIENT HEALTH QUESTIONNAIRE - PHQ9
2. FEELING DOWN, DEPRESSED OR HOPELESS: NOT AT ALL
1. LITTLE INTEREST OR PLEASURE IN DOING THINGS: NOT AT ALL
SUM OF ALL RESPONSES TO PHQ9 QUESTIONS 1 AND 2: 0

## 2023-10-10 NOTE — PROGRESS NOTES
Subjective   Patient ID: Bonifacio Ferraro is a 78 y.o. male who presents for Follow-up.    HPI     He has seen a cardiologist for follow up.  He had seen Dr. Espinosa with . He had his appointment after his medicare wellness visit.  They did additional testing which came back all normal. They had done a Holter monitor in addition to a stress test and and echocardiogram.    No more gout symptoms.  He has not gotten the follow up blood work for the uric acid level.    They did follow up with geriatric medicine.  He has been diagnosed with moderate dementia. He also follows with another physician with psychiatry for his bipolar disorder.    Review of Systems   Constitutional:  Negative for chills, fatigue and fever.   HENT:  Negative for congestion and rhinorrhea.    Respiratory:  Negative for shortness of breath.    Cardiovascular:  Negative for chest pain and palpitations.   Gastrointestinal:  Negative for abdominal pain, constipation, diarrhea, nausea and vomiting.   Genitourinary:  Negative for frequency.   Neurological:  Negative for dizziness, light-headedness and headaches.       Objective   /62   Pulse (!) 40   Temp 36.4 °C (97.5 °F)   Ht 1.829 m (6')   Wt 88 kg (194 lb)   SpO2 92%   BMI 26.31 kg/m²     Physical Exam  Vitals and nursing note reviewed.   Constitutional:       General: He is not in acute distress.     Appearance: Normal appearance. He is normal weight. He is not ill-appearing or toxic-appearing.   Cardiovascular:      Rate and Rhythm: Normal rate and regular rhythm.      Heart sounds: Normal heart sounds.   Pulmonary:      Effort: Pulmonary effort is normal.      Breath sounds: Normal breath sounds.   Abdominal:      General: Abdomen is flat. Bowel sounds are normal.      Palpations: Abdomen is soft.   Neurological:      Mental Status: He is alert.         Assessment/Plan   Problem List Items Addressed This Visit             ICD-10-CM    Sinus bradycardia R00.1     Patient  following with cardiology with  in [place].         Relevant Orders    CBC and Auto Differential    TSH with reflex to Free T4 if abnormal    Anxiety F41.9     Chronic.  Stable. Following with psychiatry.          Relevant Orders    TSH with reflex to Free T4 if abnormal    Dementia with behavioral disturbance (CMS/HCC) - Primary F03.918     Chronic.  Stable. Following with neuropsychology and gerimed.         Relevant Orders    TSH with reflex to Free T4 if abnormal    Chronic gout M1A.[id number]    Relevant Orders    Uric acid    Mild depression F32.A     Chronic.  Stable. Following with psychiatry.          Relevant Orders    CBC and Auto Differential    TSH with reflex to Free T4 if abnormal    Mood disorder (CMS/HCC) F39     Chronic.  Stable. Following with psychiatry.          Vitamin D deficiency E55.9    Relevant Orders    Vitamin D 25-Hydroxy,Total (for eval of Vitamin D levels)    Elevated uric acid in blood E79.0     No current symptoms. Recheck level ordered. Will consider starting allopurinol if still elevated.         Relevant Orders    CBC and Auto Differential     Other Visit Diagnoses         Codes    Prostate cancer screening     Z12.5    Relevant Orders    Prostate Specific Antigen    Healthcare maintenance     Z00.00    Relevant Orders    CBC and Auto Differential    Comprehensive Metabolic Panel    Lipid Panel    Urinalysis with Reflex Microscopic          Patient presenting for 6-month follow-up.  Overall doing well without any changes currently.  Continuing to follow with cardiology in [place].  No other changes at least at this time.  Repeat testing ordered came back normal.  Patient with mood disorder as well as depression.  He had been diagnosed with moderate dementia and follows with neuropsychology.  He will continue with regular appointments.  Patient has not gotten a recheck the levels for uric acid for his chronic gout or his vitamin D levels.  Lab orders are still in place and patient  can go downstairs to have this done.  Discussed that he does not need to fast.  Plan for patient to follow-up in 6 months for Medicare annual wellness visit.

## 2023-10-19 ENCOUNTER — APPOINTMENT (OUTPATIENT)
Dept: NEUROLOGY | Facility: CLINIC | Age: 78
End: 2023-10-19
Payer: MEDICARE

## 2024-02-19 DIAGNOSIS — M1A.9XX0 CHRONIC GOUT WITHOUT TOPHUS, UNSPECIFIED CAUSE, UNSPECIFIED SITE: ICD-10-CM

## 2024-02-21 RX ORDER — COLCHICINE 0.6 MG/1
CAPSULE ORAL
Qty: 15 CAPSULE | Refills: 1 | Status: SHIPPED | OUTPATIENT
Start: 2024-02-21

## 2024-04-11 ENCOUNTER — APPOINTMENT (OUTPATIENT)
Dept: PRIMARY CARE | Facility: CLINIC | Age: 79
End: 2024-04-11
Payer: MEDICARE

## 2024-04-30 ENCOUNTER — APPOINTMENT (OUTPATIENT)
Dept: PRIMARY CARE | Facility: CLINIC | Age: 79
End: 2024-04-30
Payer: MEDICARE

## 2024-05-06 ENCOUNTER — OFFICE VISIT (OUTPATIENT)
Dept: PRIMARY CARE | Facility: CLINIC | Age: 79
End: 2024-05-06
Payer: MEDICARE

## 2024-05-06 ENCOUNTER — HOSPITAL ENCOUNTER (OUTPATIENT)
Dept: VASCULAR MEDICINE | Facility: CLINIC | Age: 79
Discharge: HOME | End: 2024-05-06
Payer: MEDICARE

## 2024-05-06 VITALS
DIASTOLIC BLOOD PRESSURE: 68 MMHG | SYSTOLIC BLOOD PRESSURE: 122 MMHG | TEMPERATURE: 97.5 F | WEIGHT: 194 LBS | OXYGEN SATURATION: 96 % | BODY MASS INDEX: 26.31 KG/M2 | HEART RATE: 44 BPM

## 2024-05-06 DIAGNOSIS — M79.661 PAIN AND SWELLING OF RIGHT LOWER LEG: ICD-10-CM

## 2024-05-06 DIAGNOSIS — R60.1 GENERALIZED EDEMA: ICD-10-CM

## 2024-05-06 DIAGNOSIS — M79.89 PAIN AND SWELLING OF RIGHT LOWER LEG: ICD-10-CM

## 2024-05-06 DIAGNOSIS — R23.4 ESCHAR OF LOWER LEG: ICD-10-CM

## 2024-05-06 DIAGNOSIS — T24.331A: Primary | ICD-10-CM

## 2024-05-06 PROCEDURE — 1160F RVW MEDS BY RX/DR IN RCRD: CPT | Performed by: STUDENT IN AN ORGANIZED HEALTH CARE EDUCATION/TRAINING PROGRAM

## 2024-05-06 PROCEDURE — 3078F DIAST BP <80 MM HG: CPT | Performed by: STUDENT IN AN ORGANIZED HEALTH CARE EDUCATION/TRAINING PROGRAM

## 2024-05-06 PROCEDURE — 93971 EXTREMITY STUDY: CPT | Performed by: SURGERY

## 2024-05-06 PROCEDURE — 93971 EXTREMITY STUDY: CPT

## 2024-05-06 PROCEDURE — 99214 OFFICE O/P EST MOD 30 MIN: CPT | Performed by: STUDENT IN AN ORGANIZED HEALTH CARE EDUCATION/TRAINING PROGRAM

## 2024-05-06 PROCEDURE — 1159F MED LIST DOCD IN RCRD: CPT | Performed by: STUDENT IN AN ORGANIZED HEALTH CARE EDUCATION/TRAINING PROGRAM

## 2024-05-06 PROCEDURE — 3074F SYST BP LT 130 MM HG: CPT | Performed by: STUDENT IN AN ORGANIZED HEALTH CARE EDUCATION/TRAINING PROGRAM

## 2024-05-06 ASSESSMENT — ENCOUNTER SYMPTOMS
ABDOMINAL PAIN: 0
FATIGUE: 0
SHORTNESS OF BREATH: 0
WOUND: 1
HEADACHES: 0
FEVER: 0
LIGHT-HEADEDNESS: 0
COUGH: 0
RHINORRHEA: 0
COLOR CHANGE: 0
CHILLS: 0
DIZZINESS: 0
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
## 2024-05-10 ENCOUNTER — OFFICE VISIT (OUTPATIENT)
Dept: PRIMARY CARE | Facility: CLINIC | Age: 79
End: 2024-05-10
Payer: MEDICARE

## 2024-05-10 VITALS
OXYGEN SATURATION: 95 % | HEART RATE: 54 BPM | BODY MASS INDEX: 26.45 KG/M2 | RESPIRATION RATE: 16 BRPM | WEIGHT: 195 LBS | DIASTOLIC BLOOD PRESSURE: 57 MMHG | TEMPERATURE: 98 F | SYSTOLIC BLOOD PRESSURE: 136 MMHG

## 2024-05-10 DIAGNOSIS — M79.89 PAIN AND SWELLING OF RIGHT LOWER LEG: ICD-10-CM

## 2024-05-10 DIAGNOSIS — M79.661 PAIN AND SWELLING OF RIGHT LOWER LEG: ICD-10-CM

## 2024-05-10 DIAGNOSIS — R23.4 ESCHAR OF LOWER LEG: ICD-10-CM

## 2024-05-10 DIAGNOSIS — Z01.818 PREOPERATIVE CLEARANCE: ICD-10-CM

## 2024-05-10 DIAGNOSIS — T24.331A: Primary | ICD-10-CM

## 2024-05-10 PROCEDURE — 3078F DIAST BP <80 MM HG: CPT | Performed by: STUDENT IN AN ORGANIZED HEALTH CARE EDUCATION/TRAINING PROGRAM

## 2024-05-10 PROCEDURE — 3075F SYST BP GE 130 - 139MM HG: CPT | Performed by: STUDENT IN AN ORGANIZED HEALTH CARE EDUCATION/TRAINING PROGRAM

## 2024-05-10 PROCEDURE — 1159F MED LIST DOCD IN RCRD: CPT | Performed by: STUDENT IN AN ORGANIZED HEALTH CARE EDUCATION/TRAINING PROGRAM

## 2024-05-10 PROCEDURE — 1160F RVW MEDS BY RX/DR IN RCRD: CPT | Performed by: STUDENT IN AN ORGANIZED HEALTH CARE EDUCATION/TRAINING PROGRAM

## 2024-05-10 PROCEDURE — 1036F TOBACCO NON-USER: CPT | Performed by: STUDENT IN AN ORGANIZED HEALTH CARE EDUCATION/TRAINING PROGRAM

## 2024-05-10 PROCEDURE — 99213 OFFICE O/P EST LOW 20 MIN: CPT | Performed by: STUDENT IN AN ORGANIZED HEALTH CARE EDUCATION/TRAINING PROGRAM

## 2024-05-10 ASSESSMENT — ENCOUNTER SYMPTOMS
LIGHT-HEADEDNESS: 0
LOSS OF SENSATION IN FEET: 0
ABDOMINAL PAIN: 0
COUGH: 0
FATIGUE: 0
CHILLS: 0
DIZZINESS: 0
HEADACHES: 0
DEPRESSION: 0
WOUND: 1
FEVER: 0
SHORTNESS OF BREATH: 0
RHINORRHEA: 0
OCCASIONAL FEELINGS OF UNSTEADINESS: 0
COLOR CHANGE: 0

## 2024-05-10 ASSESSMENT — PATIENT HEALTH QUESTIONNAIRE - PHQ9
SUM OF ALL RESPONSES TO PHQ9 QUESTIONS 1 AND 2: 0
1. LITTLE INTEREST OR PLEASURE IN DOING THINGS: NOT AT ALL
2. FEELING DOWN, DEPRESSED OR HOPELESS: NOT AT ALL
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
## 2024-06-25 ENCOUNTER — APPOINTMENT (OUTPATIENT)
Dept: PRIMARY CARE | Facility: CLINIC | Age: 79
End: 2024-06-25
Payer: MEDICARE

## 2024-07-01 ENCOUNTER — LAB (OUTPATIENT)
Dept: LAB | Facility: LAB | Age: 79
End: 2024-07-01
Payer: MEDICARE

## 2024-07-01 ENCOUNTER — TELEPHONE (OUTPATIENT)
Dept: PRIMARY CARE | Facility: CLINIC | Age: 79
End: 2024-07-01

## 2024-07-01 ENCOUNTER — APPOINTMENT (OUTPATIENT)
Dept: PRIMARY CARE | Facility: CLINIC | Age: 79
End: 2024-07-01
Payer: MEDICARE

## 2024-07-01 VITALS
WEIGHT: 194 LBS | HEART RATE: 41 BPM | TEMPERATURE: 97.7 F | BODY MASS INDEX: 27.16 KG/M2 | OXYGEN SATURATION: 96 % | DIASTOLIC BLOOD PRESSURE: 67 MMHG | SYSTOLIC BLOOD PRESSURE: 138 MMHG | HEIGHT: 71 IN

## 2024-07-01 DIAGNOSIS — I10 BENIGN ESSENTIAL HTN: ICD-10-CM

## 2024-07-01 DIAGNOSIS — Z00.00 MEDICARE ANNUAL WELLNESS VISIT, SUBSEQUENT: ICD-10-CM

## 2024-07-01 DIAGNOSIS — R00.1 SINUS BRADYCARDIA: ICD-10-CM

## 2024-07-01 DIAGNOSIS — F41.9 ANXIETY: ICD-10-CM

## 2024-07-01 DIAGNOSIS — E79.0 ELEVATED URIC ACID IN BLOOD: ICD-10-CM

## 2024-07-01 DIAGNOSIS — F31.81 BIPOLAR II DISORDER (MULTI): ICD-10-CM

## 2024-07-01 DIAGNOSIS — F03.B3 MODERATE DEMENTIA WITH MOOD DISTURBANCE, UNSPECIFIED DEMENTIA TYPE (MULTI): ICD-10-CM

## 2024-07-01 DIAGNOSIS — R90.89 MRI OF BRAIN ABNORMAL: ICD-10-CM

## 2024-07-01 DIAGNOSIS — M1A.9XX0 CHRONIC GOUT WITHOUT TOPHUS, UNSPECIFIED CAUSE, UNSPECIFIED SITE: ICD-10-CM

## 2024-07-01 DIAGNOSIS — H61.22 LEFT EAR IMPACTED CERUMEN: ICD-10-CM

## 2024-07-01 DIAGNOSIS — F32.A MILD DEPRESSION: ICD-10-CM

## 2024-07-01 DIAGNOSIS — E55.9 VITAMIN D DEFICIENCY: ICD-10-CM

## 2024-07-01 DIAGNOSIS — F03.918 DEMENTIA WITH BEHAVIORAL DISTURBANCE (MULTI): ICD-10-CM

## 2024-07-01 DIAGNOSIS — E78.49 OTHER HYPERLIPIDEMIA: ICD-10-CM

## 2024-07-01 DIAGNOSIS — F39 MOOD DISORDER (CMS-HCC): ICD-10-CM

## 2024-07-01 DIAGNOSIS — T24.331S FULL THICKNESS BURN OF RIGHT LOWER LEG, SEQUELA: ICD-10-CM

## 2024-07-01 DIAGNOSIS — R73.01 ELEVATED FASTING GLUCOSE: ICD-10-CM

## 2024-07-01 DIAGNOSIS — I70.0 ABDOMINAL AORTIC ATHEROSCLEROSIS (CMS-HCC): ICD-10-CM

## 2024-07-01 DIAGNOSIS — F10.20 ALCOHOLISM (MULTI): ICD-10-CM

## 2024-07-01 DIAGNOSIS — Z12.5 PROSTATE CANCER SCREENING: ICD-10-CM

## 2024-07-01 DIAGNOSIS — Z71.89 ADVANCE DIRECTIVE DISCUSSED WITH PATIENT: ICD-10-CM

## 2024-07-01 DIAGNOSIS — N18.31 STAGE 3A CHRONIC KIDNEY DISEASE (MULTI): ICD-10-CM

## 2024-07-01 DIAGNOSIS — Z23 NEED FOR VACCINATION: ICD-10-CM

## 2024-07-01 DIAGNOSIS — Z00.00 ROUTINE GENERAL MEDICAL EXAMINATION AT HEALTH CARE FACILITY: Primary | ICD-10-CM

## 2024-07-01 DIAGNOSIS — Z87.891 FORMER SMOKER: ICD-10-CM

## 2024-07-01 DIAGNOSIS — Z13.6 SCREENING FOR CARDIOVASCULAR CONDITION: ICD-10-CM

## 2024-07-01 DIAGNOSIS — F10.10 ALCOHOL ABUSE: ICD-10-CM

## 2024-07-01 DIAGNOSIS — Z00.00 HEALTHCARE MAINTENANCE: ICD-10-CM

## 2024-07-01 PROBLEM — T24.331A: Status: ACTIVE | Noted: 2024-05-09

## 2024-07-01 PROBLEM — G89.29 ACUTE EXACERBATION OF CHRONIC LOW BACK PAIN: Status: RESOLVED | Noted: 2023-09-19 | Resolved: 2024-07-01

## 2024-07-01 PROBLEM — M54.50 ACUTE EXACERBATION OF CHRONIC LOW BACK PAIN: Status: RESOLVED | Noted: 2023-09-19 | Resolved: 2024-07-01

## 2024-07-01 LAB
25(OH)D3 SERPL-MCNC: 24 NG/ML (ref 30–100)
ALBUMIN SERPL BCP-MCNC: 4.2 G/DL (ref 3.4–5)
ALP SERPL-CCNC: 93 U/L (ref 33–136)
ALT SERPL W P-5'-P-CCNC: 13 U/L (ref 10–52)
ANION GAP SERPL CALC-SCNC: 10 MMOL/L (ref 10–20)
APPEARANCE UR: CLEAR
AST SERPL W P-5'-P-CCNC: 18 U/L (ref 9–39)
BASOPHILS # BLD AUTO: 0.04 X10*3/UL (ref 0–0.1)
BASOPHILS NFR BLD AUTO: 0.7 %
BILIRUB SERPL-MCNC: 1 MG/DL (ref 0–1.2)
BILIRUB UR STRIP.AUTO-MCNC: NEGATIVE MG/DL
BUN SERPL-MCNC: 18 MG/DL (ref 6–23)
CALCIUM SERPL-MCNC: 9.3 MG/DL (ref 8.6–10.6)
CHLORIDE SERPL-SCNC: 103 MMOL/L (ref 98–107)
CHOLEST SERPL-MCNC: 170 MG/DL (ref 0–199)
CHOLESTEROL/HDL RATIO: 3.1
CO2 SERPL-SCNC: 32 MMOL/L (ref 21–32)
COLOR UR: NORMAL
CREAT SERPL-MCNC: 1.11 MG/DL (ref 0.5–1.3)
EGFRCR SERPLBLD CKD-EPI 2021: 69 ML/MIN/1.73M*2
EOSINOPHIL # BLD AUTO: 0.24 X10*3/UL (ref 0–0.4)
EOSINOPHIL NFR BLD AUTO: 4.1 %
ERYTHROCYTE [DISTWIDTH] IN BLOOD BY AUTOMATED COUNT: 12.4 % (ref 11.5–14.5)
EST. AVERAGE GLUCOSE BLD GHB EST-MCNC: 114 MG/DL
GLUCOSE SERPL-MCNC: 88 MG/DL (ref 74–99)
GLUCOSE UR STRIP.AUTO-MCNC: NORMAL MG/DL
HBA1C MFR BLD: 5.6 %
HCT VFR BLD AUTO: 45.7 % (ref 41–52)
HDLC SERPL-MCNC: 54.7 MG/DL
HGB BLD-MCNC: 14.7 G/DL (ref 13.5–17.5)
IMM GRANULOCYTES # BLD AUTO: 0.02 X10*3/UL (ref 0–0.5)
IMM GRANULOCYTES NFR BLD AUTO: 0.3 % (ref 0–0.9)
KETONES UR STRIP.AUTO-MCNC: NEGATIVE MG/DL
LDLC SERPL CALC-MCNC: 98 MG/DL
LEUKOCYTE ESTERASE UR QL STRIP.AUTO: NEGATIVE
LYMPHOCYTES # BLD AUTO: 0.87 X10*3/UL (ref 0.8–3)
LYMPHOCYTES NFR BLD AUTO: 14.8 %
MCH RBC QN AUTO: 30.8 PG (ref 26–34)
MCHC RBC AUTO-ENTMCNC: 32.2 G/DL (ref 32–36)
MCV RBC AUTO: 96 FL (ref 80–100)
MONOCYTES # BLD AUTO: 0.67 X10*3/UL (ref 0.05–0.8)
MONOCYTES NFR BLD AUTO: 11.4 %
NEUTROPHILS # BLD AUTO: 4.02 X10*3/UL (ref 1.6–5.5)
NEUTROPHILS NFR BLD AUTO: 68.7 %
NITRITE UR QL STRIP.AUTO: NEGATIVE
NON HDL CHOLESTEROL: 115 MG/DL (ref 0–149)
NRBC BLD-RTO: 0 /100 WBCS (ref 0–0)
PH UR STRIP.AUTO: 5.5 [PH]
PLATELET # BLD AUTO: 235 X10*3/UL (ref 150–450)
POTASSIUM SERPL-SCNC: 4.1 MMOL/L (ref 3.5–5.3)
PROT SERPL-MCNC: 7.2 G/DL (ref 6.4–8.2)
PROT UR STRIP.AUTO-MCNC: NEGATIVE MG/DL
PSA SERPL-MCNC: 0.76 NG/ML
RBC # BLD AUTO: 4.78 X10*6/UL (ref 4.5–5.9)
RBC # UR STRIP.AUTO: NEGATIVE /UL
SODIUM SERPL-SCNC: 141 MMOL/L (ref 136–145)
SP GR UR STRIP.AUTO: 1.02
TRIGL SERPL-MCNC: 86 MG/DL (ref 0–149)
TSH SERPL-ACNC: 1.74 MIU/L (ref 0.44–3.98)
URATE SERPL-MCNC: 7.3 MG/DL (ref 4–7.5)
UROBILINOGEN UR STRIP.AUTO-MCNC: NORMAL MG/DL
VLDL: 17 MG/DL (ref 0–40)
WBC # BLD AUTO: 5.9 X10*3/UL (ref 4.4–11.3)

## 2024-07-01 PROCEDURE — 84443 ASSAY THYROID STIM HORMONE: CPT

## 2024-07-01 PROCEDURE — 3075F SYST BP GE 130 - 139MM HG: CPT | Performed by: STUDENT IN AN ORGANIZED HEALTH CARE EDUCATION/TRAINING PROGRAM

## 2024-07-01 PROCEDURE — 99497 ADVNCD CARE PLAN 30 MIN: CPT | Performed by: STUDENT IN AN ORGANIZED HEALTH CARE EDUCATION/TRAINING PROGRAM

## 2024-07-01 PROCEDURE — 1159F MED LIST DOCD IN RCRD: CPT | Performed by: STUDENT IN AN ORGANIZED HEALTH CARE EDUCATION/TRAINING PROGRAM

## 2024-07-01 PROCEDURE — 83036 HEMOGLOBIN GLYCOSYLATED A1C: CPT

## 2024-07-01 PROCEDURE — 80061 LIPID PANEL: CPT

## 2024-07-01 PROCEDURE — 36415 COLL VENOUS BLD VENIPUNCTURE: CPT

## 2024-07-01 PROCEDURE — 84550 ASSAY OF BLOOD/URIC ACID: CPT

## 2024-07-01 PROCEDURE — 90471 IMMUNIZATION ADMIN: CPT | Performed by: STUDENT IN AN ORGANIZED HEALTH CARE EDUCATION/TRAINING PROGRAM

## 2024-07-01 PROCEDURE — 1170F FXNL STATUS ASSESSED: CPT | Performed by: STUDENT IN AN ORGANIZED HEALTH CARE EDUCATION/TRAINING PROGRAM

## 2024-07-01 PROCEDURE — G0439 PPPS, SUBSEQ VISIT: HCPCS | Performed by: STUDENT IN AN ORGANIZED HEALTH CARE EDUCATION/TRAINING PROGRAM

## 2024-07-01 PROCEDURE — 93000 ELECTROCARDIOGRAM COMPLETE: CPT | Performed by: STUDENT IN AN ORGANIZED HEALTH CARE EDUCATION/TRAINING PROGRAM

## 2024-07-01 PROCEDURE — 1123F ACP DISCUSS/DSCN MKR DOCD: CPT | Performed by: STUDENT IN AN ORGANIZED HEALTH CARE EDUCATION/TRAINING PROGRAM

## 2024-07-01 PROCEDURE — 82306 VITAMIN D 25 HYDROXY: CPT

## 2024-07-01 PROCEDURE — 99213 OFFICE O/P EST LOW 20 MIN: CPT | Performed by: STUDENT IN AN ORGANIZED HEALTH CARE EDUCATION/TRAINING PROGRAM

## 2024-07-01 PROCEDURE — 1158F ADVNC CARE PLAN TLK DOCD: CPT | Performed by: STUDENT IN AN ORGANIZED HEALTH CARE EDUCATION/TRAINING PROGRAM

## 2024-07-01 PROCEDURE — 69209 REMOVE IMPACTED EAR WAX UNI: CPT | Performed by: STUDENT IN AN ORGANIZED HEALTH CARE EDUCATION/TRAINING PROGRAM

## 2024-07-01 PROCEDURE — 85025 COMPLETE CBC W/AUTO DIFF WBC: CPT

## 2024-07-01 PROCEDURE — 99397 PER PM REEVAL EST PAT 65+ YR: CPT | Performed by: STUDENT IN AN ORGANIZED HEALTH CARE EDUCATION/TRAINING PROGRAM

## 2024-07-01 PROCEDURE — 1160F RVW MEDS BY RX/DR IN RCRD: CPT | Performed by: STUDENT IN AN ORGANIZED HEALTH CARE EDUCATION/TRAINING PROGRAM

## 2024-07-01 PROCEDURE — 3078F DIAST BP <80 MM HG: CPT | Performed by: STUDENT IN AN ORGANIZED HEALTH CARE EDUCATION/TRAINING PROGRAM

## 2024-07-01 PROCEDURE — G0103 PSA SCREENING: HCPCS

## 2024-07-01 PROCEDURE — 81003 URINALYSIS AUTO W/O SCOPE: CPT

## 2024-07-01 PROCEDURE — 80053 COMPREHEN METABOLIC PANEL: CPT

## 2024-07-01 PROCEDURE — 90715 TDAP VACCINE 7 YRS/> IM: CPT | Performed by: STUDENT IN AN ORGANIZED HEALTH CARE EDUCATION/TRAINING PROGRAM

## 2024-07-01 RX ORDER — LISINOPRIL AND HYDROCHLOROTHIAZIDE 12.5; 2 MG/1; MG/1
1 TABLET ORAL DAILY
Qty: 90 TABLET | Refills: 1 | Status: SHIPPED | OUTPATIENT
Start: 2024-07-01 | End: 2024-12-28

## 2024-07-01 ASSESSMENT — ENCOUNTER SYMPTOMS
DIARRHEA: 0
LIGHT-HEADEDNESS: 0
PALPITATIONS: 0
NAUSEA: 0
ABDOMINAL PAIN: 0
LOSS OF SENSATION IN FEET: 0
CONSTIPATION: 0
COUGH: 0
DEPRESSION: 0
COLOR CHANGE: 0
FREQUENCY: 0
ARTHRALGIAS: 0
SHORTNESS OF BREATH: 0
NERVOUS/ANXIOUS: 0
NUMBNESS: 0
CHILLS: 0
DYSPHORIC MOOD: 0
FATIGUE: 0
SORE THROAT: 0
OCCASIONAL FEELINGS OF UNSTEADINESS: 0
DIZZINESS: 0
FEVER: 0
MYALGIAS: 0
VOMITING: 0
DYSURIA: 0
RHINORRHEA: 0

## 2024-07-01 ASSESSMENT — ACTIVITIES OF DAILY LIVING (ADL)
DRESSING: INDEPENDENT
DOING_HOUSEWORK: INDEPENDENT
MANAGING_FINANCES: INDEPENDENT
GROCERY_SHOPPING: INDEPENDENT
TAKING_MEDICATION: NEEDS ASSISTANCE
BATHING: INDEPENDENT
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
## 2024-07-01 NOTE — ACP (ADVANCE CARE PLANNING)
Confirming Previous Code Status:   Advance Care Planning Note     Discussion Date: 07/01/24   Discussion Participants: patient and spouse    The patient wishes to discuss Advance Care Planning today and the following is a brief summary of our discussion.     Patient has capacity to make their own medical decisions: Yes  Health Care Agent/Surrogate Decision Maker documented in chart: Yes    Documents on file and valid:  Advance Directive/Living Will: Yes Documented at home.  Health Care Power of : Yes Documented at home.  Other: Spouse: Alesia Ferraro    Communication of Medical Status/Prognosis:   Fair.     Communication of Treatment Goals/Options:   Quality of Life     Treatment Decisions  Goals of Care: survival is prioritized, if goals for quality or survival can reasonably be achieved     Follow Up Plan  Yearly and as needed.    Team Members  Patient, Spouse,     Time Statement: Total face to face time spent on advance care planning was 16 minutes with 6 minutes spent in counseling, including the explanation.    Bonifacio Lao DO  7/1/2024 10:40 AM

## 2024-08-21 ENCOUNTER — TELEPHONE (OUTPATIENT)
Dept: PRIMARY CARE | Facility: CLINIC | Age: 79
End: 2024-08-21
Payer: MEDICARE

## 2024-08-21 DIAGNOSIS — T14.8XXA PULLED MUSCLE: ICD-10-CM

## 2024-08-21 NOTE — TELEPHONE ENCOUNTER
Pts wife  calling for a referral to a physical therapist issues with the back of both thighs.     Pt phone: [phone]

## 2025-03-14 DIAGNOSIS — I10 BENIGN ESSENTIAL HTN: ICD-10-CM

## 2025-03-14 RX ORDER — LISINOPRIL AND HYDROCHLOROTHIAZIDE 12.5; 2 MG/1; MG/1
1 TABLET ORAL DAILY
Qty: 90 TABLET | Refills: 0 | Status: SHIPPED | OUTPATIENT
Start: 2025-03-14 | End: 2025-06-12

## 2025-03-17 ENCOUNTER — APPOINTMENT (OUTPATIENT)
Dept: PRIMARY CARE | Facility: CLINIC | Age: 80
End: 2025-03-17
Payer: MEDICARE

## 2025-03-17 VITALS
BODY MASS INDEX: 27.3 KG/M2 | SYSTOLIC BLOOD PRESSURE: 124 MMHG | HEART RATE: 42 BPM | DIASTOLIC BLOOD PRESSURE: 62 MMHG | OXYGEN SATURATION: 97 % | TEMPERATURE: 97.8 F | WEIGHT: 193 LBS

## 2025-03-17 DIAGNOSIS — R73.01 ELEVATED FASTING GLUCOSE: ICD-10-CM

## 2025-03-17 DIAGNOSIS — E79.0 ELEVATED URIC ACID IN BLOOD: ICD-10-CM

## 2025-03-17 DIAGNOSIS — I10 BENIGN ESSENTIAL HTN: ICD-10-CM

## 2025-03-17 DIAGNOSIS — R00.1 SINUS BRADYCARDIA: Primary | ICD-10-CM

## 2025-03-17 DIAGNOSIS — E55.9 VITAMIN D DEFICIENCY: ICD-10-CM

## 2025-03-17 DIAGNOSIS — Z12.5 PROSTATE CANCER SCREENING: ICD-10-CM

## 2025-03-17 DIAGNOSIS — E78.49 OTHER HYPERLIPIDEMIA: ICD-10-CM

## 2025-03-17 PROCEDURE — 99213 OFFICE O/P EST LOW 20 MIN: CPT | Performed by: STUDENT IN AN ORGANIZED HEALTH CARE EDUCATION/TRAINING PROGRAM

## 2025-03-17 PROCEDURE — 1160F RVW MEDS BY RX/DR IN RCRD: CPT | Performed by: STUDENT IN AN ORGANIZED HEALTH CARE EDUCATION/TRAINING PROGRAM

## 2025-03-17 PROCEDURE — 1159F MED LIST DOCD IN RCRD: CPT | Performed by: STUDENT IN AN ORGANIZED HEALTH CARE EDUCATION/TRAINING PROGRAM

## 2025-03-17 PROCEDURE — 3074F SYST BP LT 130 MM HG: CPT | Performed by: STUDENT IN AN ORGANIZED HEALTH CARE EDUCATION/TRAINING PROGRAM

## 2025-03-17 PROCEDURE — 3078F DIAST BP <80 MM HG: CPT | Performed by: STUDENT IN AN ORGANIZED HEALTH CARE EDUCATION/TRAINING PROGRAM

## 2025-03-17 PROCEDURE — G2211 COMPLEX E/M VISIT ADD ON: HCPCS | Performed by: STUDENT IN AN ORGANIZED HEALTH CARE EDUCATION/TRAINING PROGRAM

## 2025-03-17 PROCEDURE — 1123F ACP DISCUSS/DSCN MKR DOCD: CPT | Performed by: STUDENT IN AN ORGANIZED HEALTH CARE EDUCATION/TRAINING PROGRAM

## 2025-03-17 PROCEDURE — 1036F TOBACCO NON-USER: CPT | Performed by: STUDENT IN AN ORGANIZED HEALTH CARE EDUCATION/TRAINING PROGRAM

## 2025-03-17 RX ORDER — LISINOPRIL AND HYDROCHLOROTHIAZIDE 12.5; 2 MG/1; MG/1
1 TABLET ORAL DAILY
Qty: 90 TABLET | Refills: 0 | Status: SHIPPED | OUTPATIENT
Start: 2025-03-17 | End: 2025-06-15

## 2025-03-17 ASSESSMENT — PATIENT HEALTH QUESTIONNAIRE - PHQ9
1. LITTLE INTEREST OR PLEASURE IN DOING THINGS: NOT AT ALL
SUM OF ALL RESPONSES TO PHQ9 QUESTIONS 1 AND 2: 0
2. FEELING DOWN, DEPRESSED OR HOPELESS: NOT AT ALL

## 2025-03-17 ASSESSMENT — ENCOUNTER SYMPTOMS
LIGHT-HEADEDNESS: 0
FEVER: 0
CHILLS: 0
MYALGIAS: 0
SHORTNESS OF BREATH: 0
ABDOMINAL PAIN: 0
DIZZINESS: 0
ARTHRALGIAS: 0
COUGH: 0
HEADACHES: 0
FATIGUE: 0

## 2025-03-17 NOTE — PROGRESS NOTES
Subjective   Patient ID: Bonifacio Ferraro is a 79 y.o. male who presents for Follow-up (Low pulse rate (40s) ).    HPI     He had an eye surgery 2 weeks ago.  He had his post operative visit this morning.    H also had a senior health assessment as well through [facility].  They do want to try medication but they are concerned that it could affect his heart rate. They do not remember what it is.    He denies any episodes of lightheadedness or dizziness.  No episodes of syncope but he did have one back in 2023.    Review of Systems   Constitutional:  Negative for chills, fatigue and fever.   HENT:  Negative for congestion and postnasal drip.    Respiratory:  Negative for cough and shortness of breath.    Cardiovascular:  Negative for chest pain.   Gastrointestinal:  Negative for abdominal pain.   Musculoskeletal:  Negative for arthralgias and myalgias.   Neurological:  Negative for dizziness, light-headedness and headaches.       Objective   /62   Pulse (!) 42   Temp 36.6 °C (97.8 °F)   Wt 87.5 kg (193 lb)   SpO2 97%   BMI 27.30 kg/m²     Physical Exam  Vitals and nursing note reviewed.   Constitutional:       General: He is not in acute distress.     Appearance: Normal appearance. He is normal weight. He is not ill-appearing or toxic-appearing.   HENT:      Head: Normocephalic and atraumatic.   Cardiovascular:      Rate and Rhythm: Normal rate and regular rhythm.      Heart sounds: Normal heart sounds.   Pulmonary:      Effort: Pulmonary effort is normal.      Breath sounds: Normal breath sounds.   Neurological:      Mental Status: He is alert.         Assessment/Plan   Problem List Items Addressed This Visit             ICD-10-CM    Sinus bradycardia - Primary R00.1    Benign essential HTN I10    Relevant Medications    lisinopriL-hydrochlorothiazide 20-12.5 mg tablet    Other Relevant Orders    CBC and Auto Differential    Comprehensive Metabolic Panel    Lipid Panel    TSH with reflex to Free T4 if  abnormal    Elevated fasting glucose R73.01    Relevant Orders    Hemoglobin A1C    Urinalysis with Reflex Microscopic    Hyperlipidemia E78.5    Relevant Orders    Lipid Panel    TSH with reflex to Free T4 if abnormal    Vitamin D deficiency E55.9    Relevant Orders    Vitamin D 25-Hydroxy,Total (for eval of Vitamin D levels)    Elevated uric acid in blood E79.0    Relevant Orders    Uric acid     Other Visit Diagnoses         Codes    Prostate cancer screening     Z12.5    Relevant Orders    Prostate Specific Antigen          History and physical examination as above.  Patient following up with his lower heart rate.  He was previously diagnosed with AV block.  He did previously follow-up with cardiology and he had an echocardiogram as well as an EKG and a Holter monitor performed.  He continues to deny any symptoms from this.  Him and his wife's concern is that geriatrics would like to start him on memantine and donepezil which can further reduce the heart rate.  Reviewed previous results and notes from cardiology where they recommended against any medication that would affect the AV rate.  Did recommend against the medications for now.  Did recommend following up with cardiology.  Did review with the patient that it looks like from the prior notes that if he has any symptoms, then they would recommend possible pacemaker placement.  They will call to get set up with cardiology again.  Did refill patient's combination lisinopril hydrochlorothiazide.  He is set up for a Medicare wellness with me later in the year.  Blood work orders placed to be done prior to that appointment.

## 2025-03-17 NOTE — PATIENT INSTRUCTIONS
I went back and reviewed previous workup with cardiology including an echocardiogram, EKG as well as a Holter monitor.  You do have sinus bradycardia caused by AV block which looks like this was previously diagnosed.  When I am reviewing their previous documents, they do recommend avoiding any sort of agents that would further slow electrical conductivity especially with a low heart rate.  I did discuss that if he ever had symptoms, he would need evaluation by electrophysiology for potential pacemaker.  I would recommend getting reestablished with cardiology to discuss going on the medications recommended by geriatric medicine with them as sometimes that they can cause a further drop in heart rate which would lead to additional symptoms.  I am not exactly sure if there is any other testing that they would recommend at least at this point in time or if they would recommend following up with an electrophysiologist for additional management.    Please keep your scheduled appointment to see me in June for a Medicare wellness visit.  I did go ahead and order lab work to be done 1 to 3 weeks prior to that appointment with me in June.    Please call with any other questions or concerns.    Thank you

## 2025-06-18 DIAGNOSIS — I10 BENIGN ESSENTIAL HTN: ICD-10-CM

## 2025-06-18 RX ORDER — LISINOPRIL AND HYDROCHLOROTHIAZIDE 12.5; 2 MG/1; MG/1
1 TABLET ORAL DAILY
Qty: 90 TABLET | Refills: 0 | Status: SHIPPED | OUTPATIENT
Start: 2025-06-18 | End: 2025-09-16

## 2025-06-24 ENCOUNTER — APPOINTMENT (OUTPATIENT)
Dept: PRIMARY CARE | Facility: CLINIC | Age: 80
End: 2025-06-24
Payer: MEDICARE

## 2025-06-24 VITALS
HEIGHT: 71 IN | BODY MASS INDEX: 26.18 KG/M2 | HEART RATE: 41 BPM | DIASTOLIC BLOOD PRESSURE: 65 MMHG | SYSTOLIC BLOOD PRESSURE: 135 MMHG | OXYGEN SATURATION: 90 % | WEIGHT: 187 LBS

## 2025-06-24 DIAGNOSIS — Z11.59 NEED FOR HEPATITIS C SCREENING TEST: ICD-10-CM

## 2025-06-24 DIAGNOSIS — M79.89 SWELLING OF RIGHT MIDDLE FINGER: ICD-10-CM

## 2025-06-24 DIAGNOSIS — Z71.89 ADVANCE DIRECTIVE DISCUSSED WITH PATIENT: ICD-10-CM

## 2025-06-24 DIAGNOSIS — M1A.9XX0 CHRONIC GOUT WITHOUT TOPHUS, UNSPECIFIED CAUSE, UNSPECIFIED SITE: ICD-10-CM

## 2025-06-24 DIAGNOSIS — I10 BENIGN ESSENTIAL HTN: ICD-10-CM

## 2025-06-24 DIAGNOSIS — F03.B3 MODERATE DEMENTIA WITH MOOD DISTURBANCE, UNSPECIFIED DEMENTIA TYPE (MULTI): ICD-10-CM

## 2025-06-24 DIAGNOSIS — F31.81 BIPOLAR II DISORDER (MULTI): ICD-10-CM

## 2025-06-24 DIAGNOSIS — Z00.00 MEDICARE ANNUAL WELLNESS VISIT, SUBSEQUENT: Primary | ICD-10-CM

## 2025-06-24 DIAGNOSIS — I70.0 ABDOMINAL AORTIC ATHEROSCLEROSIS: ICD-10-CM

## 2025-06-24 DIAGNOSIS — R73.01 ELEVATED FASTING GLUCOSE: ICD-10-CM

## 2025-06-24 DIAGNOSIS — Z00.00 ROUTINE GENERAL MEDICAL EXAMINATION AT HEALTH CARE FACILITY: ICD-10-CM

## 2025-06-24 DIAGNOSIS — E55.9 VITAMIN D DEFICIENCY: ICD-10-CM

## 2025-06-24 DIAGNOSIS — N18.31 STAGE 3A CHRONIC KIDNEY DISEASE (MULTI): ICD-10-CM

## 2025-06-24 DIAGNOSIS — R00.1 SINUS BRADYCARDIA: ICD-10-CM

## 2025-06-24 DIAGNOSIS — Z13.6 SCREENING FOR CARDIOVASCULAR CONDITION: ICD-10-CM

## 2025-06-24 DIAGNOSIS — F10.20 ALCOHOLISM (MULTI): ICD-10-CM

## 2025-06-24 DIAGNOSIS — F41.9 ANXIETY: ICD-10-CM

## 2025-06-24 DIAGNOSIS — Z71.89 CARDIAC RISK COUNSELING: ICD-10-CM

## 2025-06-24 DIAGNOSIS — E78.49 OTHER HYPERLIPIDEMIA: ICD-10-CM

## 2025-06-24 DIAGNOSIS — R79.89 LOW VITAMIN B12 LEVEL: ICD-10-CM

## 2025-06-24 PROBLEM — R55 SYNCOPE: Status: RESOLVED | Noted: 2023-04-05 | Resolved: 2025-06-24

## 2025-06-24 ASSESSMENT — ENCOUNTER SYMPTOMS
PALPITATIONS: 0
COLOR CHANGE: 0
CHILLS: 0
SORE THROAT: 0
MYALGIAS: 0
ARTHRALGIAS: 0
CONSTIPATION: 0
DIZZINESS: 0
COUGH: 0
DYSPHORIC MOOD: 0
FEVER: 0
VOMITING: 0
LIGHT-HEADEDNESS: 0
FATIGUE: 0
LOSS OF SENSATION IN FEET: 0
FREQUENCY: 0
SHORTNESS OF BREATH: 0
DEPRESSION: 0
NUMBNESS: 0
OCCASIONAL FEELINGS OF UNSTEADINESS: 0
NAUSEA: 0
DYSURIA: 0
DIARRHEA: 0
NERVOUS/ANXIOUS: 0
ABDOMINAL PAIN: 0
RHINORRHEA: 0

## 2025-06-24 ASSESSMENT — ACTIVITIES OF DAILY LIVING (ADL)
DOING_HOUSEWORK: INDEPENDENT
BATHING: INDEPENDENT
DRESSING: INDEPENDENT
MANAGING_FINANCES: INDEPENDENT
GROCERY_SHOPPING: INDEPENDENT
TAKING_MEDICATION: INDEPENDENT

## 2025-06-24 ASSESSMENT — PATIENT HEALTH QUESTIONNAIRE - PHQ9
1. LITTLE INTEREST OR PLEASURE IN DOING THINGS: NOT AT ALL
2. FEELING DOWN, DEPRESSED OR HOPELESS: NOT AT ALL
SUM OF ALL RESPONSES TO PHQ9 QUESTIONS 1 AND 2: 0

## 2025-06-24 NOTE — PATIENT INSTRUCTIONS
Thank you for coming in for your Medicare wellness examination today.    I have lab work orders in place and we will give you a print out of the labs that were ordered from March as well as some additional lab work orders from today.  Get this done and [facility] laboratory.  I do recommend getting set up for an appointment to get this done since all  labs now run by [employer].  There is a number on the last page of the labs sheet document.    I would recommend Calling to get an appointment to see his cardiologist who had previously been established with with Dr. Espinosa for follow-up.    I would recommend getting the shingles and RSV vaccines and this can be received at the pharmacy since they are not available here with Medicare.  I would recommend regular annual flu vaccine as well as well as to check on any records of previous immunizations primarily with shingles.    There is an order in place for CT cardiac calcium score.  And I would recommend going on his [employer] to get this scheduled.  If you are having issues, please let me know and we can try to replace that order.  The system will not let me place a new order since it has been ordered within the last 5 years.  If you are having issues getting scheduled, please let me know.    We will go ahead and submit records release request to to the different GI offices overall [place].  It sounds that he is might of had a colonoscopy around 2022 but he cannot remember the results and when they wanted any additional follow-up if any.  He is past the point of screening but I want to make sure that there was no additional follow-up recommended.  We will try to request these records and you should be able to fill out records release forms here at the office before you go.    If you are unable to get scheduled for the CT cardiac score, please let me know as we will need to replace the order.    We can plan for a follow-up in the next 6 months.   Please keep appointments with other specialists including neuropsychiatry as well as geriatric medicine.  Next geriatric medicine appointment looks to be set up for September.    Please call with any other questions or concerns.    Thank you

## 2025-06-24 NOTE — ASSESSMENT & PLAN NOTE
ASCVD risk score is high. Reviewed results of lipid panel. Statin therapy recommended for risk reduction. Has labs ordered. Also CT cardiac score ordered.    Orders:    Albumin-Creatinine Ratio, Urine Random; Future

## 2025-06-24 NOTE — ASSESSMENT & PLAN NOTE
No aneurysm.  Last ultrasound on 4/24/2019 demonstrating atherosclerotic disease. Has followed with Dr. Espinosa in the past.

## 2025-06-24 NOTE — PROGRESS NOTES
Subjective   Reason for Visit: Bonifacio Ferraro is an 79 y.o. male here for a Medicare Wellness visit.     Past Medical, Surgical, and Family History reviewed and updated in chart.    Reviewed all medications by prescribing practitioner or clinical pharmacist (such as prescriptions, OTCs, herbal therapies and supplements) and documented in the medical record.    HPI    He hurt his right middle finger wrapped around a cord  He states that the area is still swollen involving his right middle finger.  He does have limitation of range of motion secondary to the swelling.  He does report some pain but denies that it is significant.  He does feel like it has overall improved but has not significantly changed since it started.    Dental: Once yearly  Vision: Have worn some cheaters for reading. No other glasses.    Last Colonoscopy: Done April 2022. Does not remember results. Thinks it was over on [place].  AAA Screening: Previously smoked in his 20s. AAA screening in 2019 without aneurysm.  Low Dose Lung CT Screening: Not indicated.    Influenza: Recommended annually  Tetanus: 07/01/2024  Pneumonia: Complete 2020 PCV13 and PPSV23  COVID: 10/10/2024  Shingles: Recommended  RSV: Recommended       Patient Care Team:  Bonifacio Lao DO as PCP - General  Bonifacio Lao DO as PCP - [employer] PCP     Review of Systems   Constitutional:  Negative for chills, fatigue and fever.   HENT:  Negative for congestion, rhinorrhea and sore throat.    Eyes:  Negative for visual disturbance.   Respiratory:  Negative for cough and shortness of breath.    Cardiovascular:  Negative for chest pain and palpitations.   Gastrointestinal:  Negative for abdominal pain, constipation, diarrhea, nausea and vomiting.   Genitourinary:  Negative for dysuria and frequency.   Musculoskeletal:  Negative for arthralgias and myalgias.   Skin:  Negative for color change and rash.   Neurological:  Negative for dizziness, light-headedness  "and numbness.   Psychiatric/Behavioral:  Negative for dysphoric mood. The patient is not nervous/anxious.        Objective   Vitals:  /65   Pulse (!) 41   Ht 1.791 m (5' 10.5\")   Wt 84.8 kg (187 lb)   SpO2 90%   BMI 26.45 kg/m²       Physical Exam  Vitals and nursing note reviewed.   Constitutional:       General: He is not in acute distress.     Appearance: Normal appearance. He is normal weight. He is not ill-appearing or toxic-appearing.   HENT:      Head: Normocephalic and atraumatic.      Right Ear: Tympanic membrane, ear canal and external ear normal.      Left Ear: Tympanic membrane, ear canal and external ear normal.      Nose: Nose normal.      Mouth/Throat:      Mouth: Mucous membranes are moist.      Pharynx: Oropharynx is clear.   Eyes:      Extraocular Movements: Extraocular movements intact.      Conjunctiva/sclera: Conjunctivae normal.      Pupils: Pupils are equal, round, and reactive to light.   Cardiovascular:      Rate and Rhythm: Normal rate and regular rhythm.      Pulses: Normal pulses.      Heart sounds: Normal heart sounds.   Pulmonary:      Effort: Pulmonary effort is normal.      Breath sounds: Normal breath sounds.   Abdominal:      General: Abdomen is flat. Bowel sounds are normal.      Palpations: Abdomen is soft.   Musculoskeletal:         General: Normal range of motion.      Cervical back: Normal range of motion and neck supple.   Skin:     General: Skin is warm and dry.   Neurological:      General: No focal deficit present.      Mental Status: He is alert and oriented to person, place, and time. Mental status is at baseline.      Cranial Nerves: No cranial nerve deficit.      Sensory: No sensory deficit.      Motor: No weakness.   Psychiatric:         Mood and Affect: Mood normal.         Behavior: Behavior normal.         Thought Content: Thought content normal.         Judgment: Judgment normal.       The 10-year ASCVD risk score (Austin DK, et al., 2019) is: 32.4%    " Values used to calculate the score:      Age: 79 years      Sex: Male      Is Non- : No      Diabetic: No      Tobacco smoker: No      Systolic Blood Pressure: 135 mmHg      Is BP treated: Yes      HDL Cholesterol: 54.7 mg/dL      Total Cholesterol: 170 mg/dL  Time spent was 15 mins reviewing    Assessment & Plan  Medicare annual wellness visit, subsequent         Advance directive discussed with patient         Cardiac risk counseling    Orders:    Albumin-Creatinine Ratio, Urine Random; Future    Benign essential HTN  Chronic.  Stable. Well controlled on lisinopril-hydrochlorothiazide.         Other hyperlipidemia  ASCVD risk score is high. Reviewed results of lipid panel. Statin therapy recommended for risk reduction. Has labs ordered. Also CT cardiac score ordered.    Orders:    Albumin-Creatinine Ratio, Urine Random; Future    Need for hepatitis C screening test    Orders:    Hepatitis C antibody; Future    Sinus bradycardia  Previously established with cardiology with Dr. Espinosa in 06/2023. Recommended annual follow up with cardiology to stay established.         Abdominal aortic atherosclerosis  No aneurysm.  Last ultrasound on 4/24/2019 demonstrating atherosclerotic disease. Has followed with Dr. Espinosa in the past.         Low vitamin B12 level    Orders:    Vitamin B12; Future    Folate; Future    Vitamin D deficiency         Stage 3a chronic kidney disease (Multi)         Alcoholism (Multi)  Chronic. Still drinking 2 beers a day average. Wife reports other times more. Following with [facility]. Chronic and stable.         Anxiety  Following with [facility]. Chronic and stable.         Elevated fasting glucose         Bipolar II disorder (Multi)  Following with [facility]. Chronic and stable.         Moderate dementia with mood disturbance, unspecified dementia type (Multi)  Following with [facility]. Chronic and stable. Also seeing  geriatric medicine.         Chronic gout without tophus, unspecified cause, unspecified site  Chronic.  No current symptoms.         Swelling of right middle finger    Orders:    XR fingers right 2+ views; Future    Screening for cardiovascular condition         Routine general medical examination at health care facility    Orders:    1 Year Follow Up In [facility] - PCP - Wellness Exam; Future           Advance Directives Discussion  16 - 20 minutes were spent discussing Advanced Care Planning (including a Living Will, Medical Power Of , as well as specific end of life choices and/or directives). The details of that discussion were documented in Advanced Directives Discussion section of the medical record.     Cardiac Risk Assessment  15 - 20 minutes were spent discussing Cardiovascular risk and, if needed, lifestyle modifications were recommended, including nutritional choices, exercise, and elimination of habits contributing to risk.   Aspirin use/disuse was discussed following the guidelines below:  low dose ASA ( mg) should be considered:    If prior Heart Attack/Stroke/Peripheral vascular disease:  Generally recommend daily low dose aspirin unless extremely high bleeding risk (e.g., gastrointestinal).    If no prior Heart Attack/Stroke/Peripheral vascular disease:              Age over 70: Do not use Aspirin for prevention    Age less than 70 and 10-year cardiovascular disease risk is >20%: use low dose Aspirin for prevention.

## 2025-06-24 NOTE — ASSESSMENT & PLAN NOTE
Chronic. Still drinking 2 beers a day average. Wife reports other times more. Following with [facility]. Chronic and stable.

## 2025-06-24 NOTE — ACP (ADVANCE CARE PLANNING)
Confirming Previous Code Status:   Advance Care Planning Note     Discussion Date: 06/24/25   Discussion Participants: patient and spouse    The patient wishes to discuss Advance Care Planning today and the following is a brief summary of our discussion.     Patient has capacity to make their own medical decisions: Yes  Health Care Agent/Surrogate Decision Maker documented in chart: Yes    Documents on file and valid:  Advance Directive/Living Will: Yes  Documented at home and up to date  Health Care Power of : Yes  Documented at home and up to date  Other: Spouse: Alesia Ferraro     Communication of Medical Status/Prognosis:   Fair     Communication of Treatment Goals/Options:   Maintain health and well being     Treatment Decisions  Goals of Care: survival is prioritized, if goals for quality or survival can reasonably be achieved     Follow Up Plan  Yearly and as needed    Team Members  Patient, spouse    Time Statement: Total face to face time spent on advance care planning was 16 minutes with 16 minutes spent in counseling, including the explanation.    Bonifacio Lao DO  6/24/2025 9:45 AM

## 2025-06-24 NOTE — ASSESSMENT & PLAN NOTE
Previously established with cardiology with Dr. Espinosa in 06/2023. Recommended annual follow up with cardiology to stay established.

## 2025-07-01 ENCOUNTER — APPOINTMENT (OUTPATIENT)
Dept: PRIMARY CARE | Facility: CLINIC | Age: 80
End: 2025-07-01
Payer: MEDICARE

## 2025-07-31 PROBLEM — R00.1 BRADYCARDIA: Status: ACTIVE | Noted: 2025-03-12

## 2025-08-19 ENCOUNTER — APPOINTMENT (OUTPATIENT)
Dept: CARDIOLOGY | Facility: CLINIC | Age: 80
End: 2025-08-19
Payer: MEDICARE

## 2025-12-22 ENCOUNTER — APPOINTMENT (OUTPATIENT)
Dept: PRIMARY CARE | Facility: CLINIC | Age: 80
End: 2025-12-22
Payer: MEDICARE